# Patient Record
Sex: FEMALE | Race: WHITE | Employment: FULL TIME | URBAN - METROPOLITAN AREA
[De-identification: names, ages, dates, MRNs, and addresses within clinical notes are randomized per-mention and may not be internally consistent; named-entity substitution may affect disease eponyms.]

---

## 2017-05-10 LAB
BLD GP AB SCN SERPL QL: NEGATIVE
HBV SURFACE AG SERPL QL IA: NON REACTIVE
HIV 1+2 AB+HIV1 P24 AG SERPL QL IA: NORMAL
RUBELLA ANTIBODY IGG QUANTITATIVE: NORMAL IU/ML
T PALLIDUM IGG SER QL: NON REACTIVE

## 2017-08-24 ENCOUNTER — OFFICE VISIT (OUTPATIENT)
Dept: SURGERY | Facility: CLINIC | Age: 32
End: 2017-08-24
Payer: COMMERCIAL

## 2017-08-24 VITALS
SYSTOLIC BLOOD PRESSURE: 100 MMHG | HEIGHT: 61 IN | WEIGHT: 155 LBS | DIASTOLIC BLOOD PRESSURE: 50 MMHG | BODY MASS INDEX: 29.27 KG/M2 | HEART RATE: 70 BPM

## 2017-08-24 DIAGNOSIS — K42.9 UMBILICAL HERNIA WITHOUT OBSTRUCTION AND WITHOUT GANGRENE: Primary | ICD-10-CM

## 2017-08-24 PROCEDURE — 99244 OFF/OP CNSLTJ NEW/EST MOD 40: CPT | Performed by: SURGERY

## 2017-08-24 NOTE — LETTER
"    2017    [facility]  Surgery Consultation    RE:  Chelle Rocha-:  [birth year]     CONSULTATION REQUESTED BY:  Tu Amanda [phone]     HPI: Patient is a 31-year-old female referred by her attending obstetrician for evaluation of umbilical hernia.  She is currently 25 weeks pregnant and states that she has noted the development of a bulge within her umbilicus.  She feels that this was also present with her previous pregnancy.  Causes her occasional mild discomfort.  Her bowel function has been normal.     PMH:   has a past medical history of Abnormal Pap smear; Anemia; Breast disorder; Migraine; PONV (postoperative nausea and vomiting); Postpartum depression; Renal disease; Rh incompatibility; Seasonal allergies; TMJ (temporomandibular joint syndrome); UTI in pregnancy; and Wounds and injuries.  PSH:    has a past surgical history that includes WISDOM TEETH EXTRACTION[; GYN surgery; Arthroscopy temporomandibular joint (TMJ) (2012); leep tx, cervical (); Colposcopy, biopsy, combined (); REMOVAL OF OVARIAN CYST(S) (); Davinci Single Site Pelviscopy (N/A, 2014); Operative hysteroscopy with morcellator (N/A, 2014); Breast surgery (, ); and ENT surgery (2003).  Social History:   reports that she has never smoked. She has never used smokeless tobacco. She reports that she drinks alcohol. She reports that she does not use illicit drugs.  Family History:  family history is not on file.  Medications/Allergies: Home medications and allergies reviewed.     ROS:  The 10 point Review of Systems is negative other than noted in the HPI.     Physical Exam:  /50  Pulse 70  Ht 5' 1\" (1.549 m)  Wt 155 lb (70.3 kg)  BMI 29.29 kg/m2  GENERAL: Generally appears well.  Psych: Alert and Oriented.  Normal affect  Eyes: Sclera clear  Respiratory:  Lungs clear to ausculation bilaterally with good air excursion  Cardiovascular:  Regular Rate and " Rhythm with no murmurs gallops or rubs, normal peripheral pulses  GI: Abdomen Non Distended Mild tenderness to palpation periumbilical Umbilical hernia palpated.  Hernia easily reduciable..  Lymphatic/Hematologic/Immune:  No femoral or cervical lymphadenopathy.  Integumentary:  No rashes  Neurological: grossly intact      All new lab and imaging data was reviewed.      Impression and Plan:  Patient is a 31 year old female with umbilical hernia     PLAN: Patient informed me that she is looking to perhaps have this repaired shortly after delivery possibly associated with a postpartum tubal ligation.  I believe that this would be appropriate.  We will look to coordinate this with her obstetrician's office closer to her date of delivery.  I discussed the pathophysiology of hernias and options for repair including laparoscopic VS open.  The risks associated with the procedure including, but not limited to, recurrence, nerve entrapment or injury, persistence of pain, injury to the bowel/bladder, infertility, hematoma, mesh migration, mesh infection, MI, and PE were discussed with the patient. She indicated understanding of the discussion, asked appropriate questions, and provided consent. Signs and symptoms of incarceration were discussed. If these develop in the interim, she promises to call or go straight to the ER. I have provided the patient with an information pamphlet.     Thank you very much for this consult.     Wyatt Golden M.D.  [facility]  [phone]

## 2017-08-24 NOTE — PROGRESS NOTES
"[facility]  Surgery Consultation    CONSULTATION REQUESTED BY:  English Tu Luis [phone]    HPI: Patient is a 31-year-old female referred by her attending obstetrician for evaluation of umbilical hernia.  She is currently 25 weeks pregnant and states that she has noted the development of a bulge within her umbilicus.  She feels that this was also present with her previous pregnancy.  Causes her occasional mild discomfort.  Her bowel function has been normal.    PMH:   has a past medical history of Abnormal Pap smear; Anemia; Breast disorder; Migraine; PONV (postoperative nausea and vomiting); Postpartum depression; Renal disease; Rh incompatibility; Seasonal allergies; TMJ (temporomandibular joint syndrome); UTI in pregnancy; and Wounds and injuries.  PSH:    has a past surgical history that includes WISDOM TEETH EXTRACTION[; GYN surgery; Arthroscopy temporomandibular joint (TMJ) (7/12/2012); leep tx, cervical (2005); Colposcopy, biopsy, combined (2005); REMOVAL OF OVARIAN CYST(S) (2006); Davinci Single Site Pelviscopy (N/A, 9/25/2014); Operative hysteroscopy with morcellator (N/A, 9/25/2014); Breast surgery (2011, 2013); and ENT surgery (12/2003).  Social History:   reports that she has never smoked. She has never used smokeless tobacco. She reports that she drinks alcohol. She reports that she does not use illicit drugs.  Family History:  family history is not on file.  Medications/Allergies: Home medications and allergies reviewed.    ROS:  The 10 point Review of Systems is negative other than noted in the HPI.    Physical Exam:  /50  Pulse 70  Ht 5' 1\" (1.549 m)  Wt 155 lb (70.3 kg)  BMI 29.29 kg/m2  GENERAL: Generally appears well.  Psych: Alert and Oriented.  Normal affect  Eyes: Sclera clear  Respiratory:  Lungs clear to ausculation bilaterally with good air excursion  Cardiovascular:  Regular Rate and Rhythm with no murmurs gallops or rubs, normal peripheral pulses  GI: " Abdomen Non Distended Mild tenderness to palpation periumbilical Umbilical hernia palpated.  Hernia easily reduciable..  Lymphatic/Hematologic/Immune:  No femoral or cervical lymphadenopathy.  Integumentary:  No rashes  Neurological: grossly intact     All new lab and imaging data was reviewed.     Impression and Plan:  Patient is a 31 year old female with umbilical hernia    PLAN: Patient informed me that she is looking to perhaps have this repaired shortly after delivery possibly associated with a postpartum tubal ligation.  I believe that this would be appropriate.  We will look to coordinate this with her obstetrician's office closer to her date of delivery.  I discussed the pathophysiology of hernias and options for repair including laparoscopic VS open.  The risks associated with the procedure including, but not limited to, recurrence, nerve entrapment or injury, persistence of pain, injury to the bowel/bladder, infertility, hematoma, mesh migration, mesh infection, MI, and PE were discussed with the patient. She indicated understanding of the discussion, asked appropriate questions, and provided consent. Signs and symptoms of incarceration were discussed. If these develop in the interim, she promises to call or go straight to the ER. I have provided the patient with an information pamphlet.    Thank you very much for this consult.    Wyatt Golden M.D.  [facility]  [phone]    Please route or send letter to:  Primary Care Provider (PCP) and Referring Provider

## 2017-08-24 NOTE — MR AVS SNAPSHOT
"              After Visit Summary   2017    Chelle Rocha    MRN: [id number]           Patient Information     Date Of Birth          1985        Visit Information        Provider Department      2017 9:30 AM Wyatt Golden MD [facility]       Follow-ups after your visit        Who to contact     If you have questions or need follow up information about today's clinic visit or your schedule please contact [facility] directly at [phone].  Normal or non-critical lab and imaging results will be communicated to you by Caravanhart, letter or phone within 4 business days after the clinic has received the results. If you do not hear from us within 7 days, please contact the clinic through Caravanhart or phone. If you have a critical or abnormal lab result, we will notify you by phone as soon as possible.  Submit refill requests through Cormedics or call your pharmacy and they will forward the refill request to us. Please allow 3 business days for your refill to be completed.          Additional Information About Your Visit        MyChart Information     Cormedics lets you send messages to your doctor, view your test results, renew your prescriptions, schedule appointments and more. To sign up, go to [email or web address] . Click on \"Log in\" on the left side of the screen, which will take you to the Welcome page. Then click on \"Sign up Now\" on the right side of the page.     You will be asked to enter the access code listed below, as well as some personal information. Please follow the directions to create your username and password.     Your access code is: [email or web address]  Expires: 2017 10:02 AM     Your access code will  in 90 days. If you need help or a new code, please call your [place] clinic or [phone].        Care EveryWhere ID     This is your Care EveryWhere ID. This could be used by other " "organizations to access your [place] medical records  [id number]        Your Vitals Were     Pulse Height BMI (Body Mass Index)             70 5' 1\" (1.549 m) 29.29 kg/m2          Blood Pressure from Last 3 Encounters:   08/24/17 100/50   10/12/14 143/89   09/25/14 113/71    Weight from Last 3 Encounters:   08/24/17 155 lb (70.3 kg)   10/12/14 132 lb (59.9 kg)   09/25/14 134 lb (60.8 kg)              Today, you had the following     No orders found for display       Primary Care Provider Office Phone # Fax #    Tu Amanda [phone]       OB GYN [address] [place] [id number]        Equal Access to Services     APRYL [place] : Hadii jillian burnham hadasho Soomaali, waaxda luqadaha, qaybta kaalmada adeegyada, anh singleton . So [facility] [phone].    ATENCIÓN: Si habla español, tiene a avitia disposición servicios gratuitos de asistencia lingüística. Llame al [phone].    We comply with applicable federal civil rights laws and [place] laws. We do not discriminate on the basis of race, color, national origin, age, disability sex, sexual orientation or gender identity.            Thank you!     Thank you for choosing [facility]  for your care. Our goal is always to provide you with excellent care. Hearing back from our patients is one way we can continue to improve our services. Please take a few minutes to complete the written survey that you may receive in the mail after your visit with us. Thank you!             Your Updated Medication List - Protect others around you: Learn how to safely use, store and throw away your medicines at www.disposemymeds.org.          This list is accurate as of: 8/24/17 10:02 AM.  Always use your most recent med list.                   Brand Name Dispense Instructions for use Diagnosis    levonorgestrel-ethinyl estradiol 0.1-20 MG-MCG per tablet    HOLGER LEWIS LESSINA     Take 1 tablet by mouth " daily        ZANTAC PO      Take 75 mg by mouth 2 times daily

## 2017-10-05 ENCOUNTER — OFFICE VISIT (OUTPATIENT)
Dept: SURGERY | Facility: CLINIC | Age: 32
End: 2017-10-05
Payer: COMMERCIAL

## 2017-10-05 VITALS — HEART RATE: 80 BPM | SYSTOLIC BLOOD PRESSURE: 112 MMHG | DIASTOLIC BLOOD PRESSURE: 50 MMHG | RESPIRATION RATE: 16 BRPM

## 2017-10-05 DIAGNOSIS — K42.9 UMBILICAL HERNIA WITHOUT OBSTRUCTION AND WITHOUT GANGRENE: Primary | ICD-10-CM

## 2017-10-05 DIAGNOSIS — M62.08 DIASTASIS RECTI: ICD-10-CM

## 2017-10-05 PROCEDURE — 99214 OFFICE O/P EST MOD 30 MIN: CPT | Performed by: SURGERY

## 2017-10-05 NOTE — MR AVS SNAPSHOT
"              After Visit Summary   10/5/2017    Chelle Rocha    MRN: [id number]           Patient Information     Date Of Birth          1985        Visit Information        Provider Department      10/5/2017 11:00 AM Wyatt Golden MD [facility] General Surgery       Follow-ups after your visit        Who to contact     If you have questions or need follow up information about today's clinic visit or your schedule please contact [facility] directly at [phone].  Normal or non-critical lab and imaging results will be communicated to you by MyChart, letter or phone within 4 business days after the clinic has received the results. If you do not hear from us within 7 days, please contact the clinic through [employer] Gardens[employer] or phone. If you have a critical or abnormal lab result, we will notify you by phone as soon as possible.  Submit refill requests through [email or web address] or call your pharmacy and they will forward the refill request to us. Please allow 3 business days for your refill to be completed.          Additional Information About Your Visit        MyChart Information     [email or web address] lets you send messages to your doctor, view your test results, renew your prescriptions, schedule appointments and more. To sign up, go to [email or web address] . Click on \"Log in\" on the left side of the screen, which will take you to the Welcome page. Then click on \"Sign up Now\" on the right side of the page.     You will be asked to enter the access code listed below, as well as some personal information. Please follow the directions to create your username and password.     Your access code is: [email or web address]  Expires: 2017 10:02 AM     Your access code will  in 90 days. If you need help or a new code, please call your [place] clinic or [phone].        Care EveryWhere ID     This is your Care EveryWhere ID. This could be used by other " organizations to access your [place] medical records  [id number]        Your Vitals Were     Pulse Respirations                80 16           Blood Pressure from Last 3 Encounters:   10/05/17 112/50   08/24/17 100/50   10/12/14 143/89    Weight from Last 3 Encounters:   08/24/17 155 lb (70.3 kg)   10/12/14 132 lb (59.9 kg)   09/25/14 134 lb (60.8 kg)              Today, you had the following     No orders found for display       Primary Care Provider Office Phone # Fax #    Tu Amanda [phone]       OB GYN [address]  [facility] [id number]        Equal Access to Services     APRYL HELTON : Hadii jillian ku hadasho Soomaali, waaxda luqadaha, qaybta kaalmada adeegyada, anh singleton . So [facility] [phone].    ATENCIÓN: Si habla español, tiene a avitia disposición servicios gratuitos de asistencia lingüística. Llame al [phone].    We comply with applicable federal civil rights laws and [place] laws. We do not discriminate on the basis of race, color, national origin, age, disability, sex, sexual orientation, or gender identity.            Thank you!     Thank you for choosing [facility]  for your care. Our goal is always to provide you with excellent care. Hearing back from our patients is one way we can continue to improve our services. Please take a few minutes to complete the written survey that you may receive in the mail after your visit with us. Thank you!             Your Updated Medication List - Protect others around you: Learn how to safely use, store and throw away your medicines at www.disposemymeds.org.          This list is accurate as of: 10/5/17 11:22 AM.  Always use your most recent med list.                   Brand Name Dispense Instructions for use Diagnosis    levonorgestrel-ethinyl estradiol 0.1-20 MG-MCG per tablet    HOLGER LEWIS LESSINA     Take 1 tablet by mouth daily        ZANTAC PO      Take 75  mg by mouth 2 times daily

## 2017-10-05 NOTE — LETTER
2017    Re: Chelle Rocha : 1985    Patient is a pleasant 31-year-old female seen previously by myself for consultation of umbilical hernia. Please reference my note from  regarding that visit.  She is currently pregnant and comes in to discuss issues associated with rectus diastases.  She feels that this has become more prevalent with her subsequent pregnancies.  She has some degree of back pain and discomfort associated with this.  She also feels significant core instability and weakness.     She was again examined in the office.  She has an obvious umbilical hernia and palpable diastases although it is somewhat difficult to assess given her gravid state.     We discussed at length the management options associated with diastases and umbilical hernia.  I think she will be a good candidate for abdominal wall reconstruction.  That said I would like to see her at least six months postpartum before we would undertake that procedure.  Her questions were all answered.  I requested that she come in for consult when she is approximately 4-5 months postpartum to discuss this further.      Wyatt Golden MD

## 2017-10-16 NOTE — PROGRESS NOTES
Patient is a pleasant 31-year-old female seen previously by myself for consultation of umbilical hernia.  Please reference my note from August 24 regarding that visit.  She is currently pregnant and comes in to discuss issues associated with rectus diastases.  She feels that this has become more prevalent with her subsequent pregnancies.  She has some degree of back pain and discomfort associated with this.  She also feels significant core instability and weakness.    She was again examined in the office.  She has an obvious umbilical hernia and palpable diastases although it is somewhat difficult to assess given her gravid state.    We discussed at length the management options associated with diastases and umbilical hernia.  I think she will be a good candidate for abdominal wall reconstruction.  That said I would like to see her at least six months postpartum before we would undertake that procedure.  Her questions were all answered.  I requested that she come in for consult when she is approximately 4-5 months postpartum to discuss this further.  Total time spent was 30 minutes with greater than 50% in face-to-face consultation.    Please route or send letter to:  Primary Care Provider (PCP) and Referring Provider

## 2017-11-14 LAB — GROUP B STREP PCR: NEGATIVE

## 2017-12-08 ENCOUNTER — ANESTHESIA (OUTPATIENT)
Dept: OBGYN | Facility: CLINIC | Age: 32
End: 2017-12-08
Payer: COMMERCIAL

## 2017-12-08 ENCOUNTER — ANESTHESIA EVENT (OUTPATIENT)
Dept: OBGYN | Facility: CLINIC | Age: 32
End: 2017-12-08
Payer: COMMERCIAL

## 2017-12-08 ENCOUNTER — HOSPITAL ENCOUNTER (INPATIENT)
Facility: CLINIC | Age: 32
LOS: 2 days | Discharge: HOME | End: 2017-12-10
Attending: OBSTETRICS & GYNECOLOGY | Admitting: OBSTETRICS & GYNECOLOGY
Payer: COMMERCIAL

## 2017-12-08 DIAGNOSIS — D62 ACUTE POSTHEMORRHAGIC ANEMIA: Primary | ICD-10-CM

## 2017-12-08 LAB
ABO + RH BLD: NORMAL
ABO + RH BLD: NORMAL
BLOOD BANK CMNT PATIENT-IMP: NORMAL
SPECIMEN EXP DATE BLD: NORMAL
T PALLIDUM IGG+IGM SER QL: NEGATIVE

## 2017-12-08 PROCEDURE — 25000128 H RX IP 250 OP 636: Performed by: PHYSICIAN ASSISTANT

## 2017-12-08 PROCEDURE — 36415 COLL VENOUS BLD VENIPUNCTURE: CPT | Performed by: PHYSICIAN ASSISTANT

## 2017-12-08 PROCEDURE — 25000125 ZZHC RX 250: Performed by: PHYSICIAN ASSISTANT

## 2017-12-08 PROCEDURE — 25000132 ZZH RX MED GY IP 250 OP 250 PS 637: Performed by: OBSTETRICS & GYNECOLOGY

## 2017-12-08 PROCEDURE — 25000128 H RX IP 250 OP 636: Performed by: ANESTHESIOLOGY

## 2017-12-08 PROCEDURE — 10907ZC DRAINAGE OF AMNIOTIC FLUID, THERAPEUTIC FROM PRODUCTS OF CONCEPTION, VIA NATURAL OR ARTIFICIAL OPENING: ICD-10-PCS | Performed by: OBSTETRICS & GYNECOLOGY

## 2017-12-08 PROCEDURE — 86901 BLOOD TYPING SEROLOGIC RH(D): CPT | Performed by: PHYSICIAN ASSISTANT

## 2017-12-08 PROCEDURE — 0HQ9XZZ REPAIR PERINEUM SKIN, EXTERNAL APPROACH: ICD-10-PCS | Performed by: OBSTETRICS & GYNECOLOGY

## 2017-12-08 PROCEDURE — 00HU33Z INSERTION OF INFUSION DEVICE INTO SPINAL CANAL, PERCUTANEOUS APPROACH: ICD-10-PCS | Performed by: ANESTHESIOLOGY

## 2017-12-08 PROCEDURE — 3E0R3BZ INTRODUCTION OF ANESTHETIC AGENT INTO SPINAL CANAL, PERCUTANEOUS APPROACH: ICD-10-PCS | Performed by: ANESTHESIOLOGY

## 2017-12-08 PROCEDURE — 25000125 ZZHC RX 250: Performed by: OBSTETRICS & GYNECOLOGY

## 2017-12-08 PROCEDURE — 37000011 ZZH ANESTHESIA WARD SERVICE

## 2017-12-08 PROCEDURE — 86780 TREPONEMA PALLIDUM: CPT | Performed by: PHYSICIAN ASSISTANT

## 2017-12-08 PROCEDURE — 10D17Z9 MANUAL EXTRACTION OF PRODUCTS OF CONCEPTION, RETAINED, VIA NATURAL OR ARTIFICIAL OPENING: ICD-10-PCS | Performed by: OBSTETRICS & GYNECOLOGY

## 2017-12-08 PROCEDURE — 86900 BLOOD TYPING SEROLOGIC ABO: CPT | Performed by: PHYSICIAN ASSISTANT

## 2017-12-08 PROCEDURE — 3E033VJ INTRODUCTION OF OTHER HORMONE INTO PERIPHERAL VEIN, PERCUTANEOUS APPROACH: ICD-10-PCS | Performed by: OBSTETRICS & GYNECOLOGY

## 2017-12-08 PROCEDURE — 12000037 ZZH R&B POSTPARTUM INTERMEDIATE

## 2017-12-08 PROCEDURE — 72200001 ZZH LABOR CARE VAGINAL DELIVERY SINGLE

## 2017-12-08 RX ORDER — IBUPROFEN 600 MG/1
600 TABLET, FILM COATED ORAL EVERY 6 HOURS PRN
Status: DISCONTINUED | OUTPATIENT
Start: 2017-12-08 | End: 2017-12-10 | Stop reason: HOSPADM

## 2017-12-08 RX ORDER — OXYTOCIN 10 [USP'U]/ML
10 INJECTION, SOLUTION INTRAMUSCULAR; INTRAVENOUS
Status: DISCONTINUED | OUTPATIENT
Start: 2017-12-08 | End: 2017-12-10 | Stop reason: HOSPADM

## 2017-12-08 RX ORDER — NALOXONE HYDROCHLORIDE 0.4 MG/ML
.1-.4 INJECTION, SOLUTION INTRAMUSCULAR; INTRAVENOUS; SUBCUTANEOUS
Status: DISCONTINUED | OUTPATIENT
Start: 2017-12-08 | End: 2017-12-08

## 2017-12-08 RX ORDER — OXYTOCIN/0.9 % SODIUM CHLORIDE 30/500 ML
340 PLASTIC BAG, INJECTION (ML) INTRAVENOUS CONTINUOUS PRN
Status: DISCONTINUED | OUTPATIENT
Start: 2017-12-08 | End: 2017-12-10 | Stop reason: HOSPADM

## 2017-12-08 RX ORDER — NALOXONE HYDROCHLORIDE 0.4 MG/ML
.1-.4 INJECTION, SOLUTION INTRAMUSCULAR; INTRAVENOUS; SUBCUTANEOUS
Status: DISCONTINUED | OUTPATIENT
Start: 2017-12-08 | End: 2017-12-10 | Stop reason: HOSPADM

## 2017-12-08 RX ORDER — OXYTOCIN 10 [USP'U]/ML
10 INJECTION, SOLUTION INTRAMUSCULAR; INTRAVENOUS
Status: DISCONTINUED | OUTPATIENT
Start: 2017-12-08 | End: 2017-12-08

## 2017-12-08 RX ORDER — SODIUM CHLORIDE, SODIUM LACTATE, POTASSIUM CHLORIDE, CALCIUM CHLORIDE 600; 310; 30; 20 MG/100ML; MG/100ML; MG/100ML; MG/100ML
INJECTION, SOLUTION INTRAVENOUS CONTINUOUS
Status: DISCONTINUED | OUTPATIENT
Start: 2017-12-08 | End: 2017-12-08

## 2017-12-08 RX ORDER — IBUPROFEN 400 MG/1
800 TABLET, FILM COATED ORAL
Status: DISCONTINUED | OUTPATIENT
Start: 2017-12-08 | End: 2017-12-08

## 2017-12-08 RX ORDER — OXYTOCIN/0.9 % SODIUM CHLORIDE 30/500 ML
100-340 PLASTIC BAG, INJECTION (ML) INTRAVENOUS CONTINUOUS PRN
Status: DISCONTINUED | OUTPATIENT
Start: 2017-12-08 | End: 2017-12-08

## 2017-12-08 RX ORDER — FENTANYL CITRATE 50 UG/ML
100 INJECTION, SOLUTION INTRAMUSCULAR; INTRAVENOUS ONCE
Status: COMPLETED | OUTPATIENT
Start: 2017-12-08 | End: 2017-12-08

## 2017-12-08 RX ORDER — ROPIVACAINE HYDROCHLORIDE 2 MG/ML
10 INJECTION, SOLUTION EPIDURAL; INFILTRATION; PERINEURAL ONCE
Status: COMPLETED | OUTPATIENT
Start: 2017-12-08 | End: 2017-12-08

## 2017-12-08 RX ORDER — CEFAZOLIN SODIUM 2 G/100ML
2 INJECTION, SOLUTION INTRAVENOUS
Status: DISCONTINUED | OUTPATIENT
Start: 2017-12-08 | End: 2017-12-08 | Stop reason: HOSPADM

## 2017-12-08 RX ORDER — ACETAMINOPHEN 325 MG/1
650 TABLET ORAL EVERY 4 HOURS PRN
Status: DISCONTINUED | OUTPATIENT
Start: 2017-12-08 | End: 2017-12-10 | Stop reason: HOSPADM

## 2017-12-08 RX ORDER — OXYTOCIN/0.9 % SODIUM CHLORIDE 30/500 ML
1-24 PLASTIC BAG, INJECTION (ML) INTRAVENOUS CONTINUOUS
Status: DISCONTINUED | OUTPATIENT
Start: 2017-12-08 | End: 2017-12-08

## 2017-12-08 RX ORDER — MISOPROSTOL 200 UG/1
400 TABLET ORAL
Status: DISCONTINUED | OUTPATIENT
Start: 2017-12-08 | End: 2017-12-10 | Stop reason: HOSPADM

## 2017-12-08 RX ORDER — HYDROCORTISONE 2.5 %
CREAM (GRAM) TOPICAL 3 TIMES DAILY PRN
Status: DISCONTINUED | OUTPATIENT
Start: 2017-12-08 | End: 2017-12-10 | Stop reason: HOSPADM

## 2017-12-08 RX ORDER — NALBUPHINE HYDROCHLORIDE 10 MG/ML
2.5-5 INJECTION, SOLUTION INTRAMUSCULAR; INTRAVENOUS; SUBCUTANEOUS EVERY 6 HOURS PRN
Status: DISCONTINUED | OUTPATIENT
Start: 2017-12-08 | End: 2017-12-08

## 2017-12-08 RX ORDER — CARBOPROST TROMETHAMINE 250 UG/ML
250 INJECTION, SOLUTION INTRAMUSCULAR
Status: COMPLETED | OUTPATIENT
Start: 2017-12-08 | End: 2017-12-08

## 2017-12-08 RX ORDER — AMOXICILLIN 250 MG
1 CAPSULE ORAL 2 TIMES DAILY PRN
Status: DISCONTINUED | OUTPATIENT
Start: 2017-12-08 | End: 2017-12-10 | Stop reason: HOSPADM

## 2017-12-08 RX ORDER — ACETAMINOPHEN 325 MG/1
650 TABLET ORAL EVERY 4 HOURS PRN
Status: DISCONTINUED | OUTPATIENT
Start: 2017-12-08 | End: 2017-12-08

## 2017-12-08 RX ORDER — HYDROCODONE BITARTRATE AND ACETAMINOPHEN 5; 325 MG/1; MG/1
1 TABLET ORAL EVERY 6 HOURS PRN
Status: DISCONTINUED | OUTPATIENT
Start: 2017-12-08 | End: 2017-12-09 | Stop reason: CLARIF

## 2017-12-08 RX ORDER — OXYTOCIN/0.9 % SODIUM CHLORIDE 30/500 ML
100 PLASTIC BAG, INJECTION (ML) INTRAVENOUS CONTINUOUS
Status: DISCONTINUED | OUTPATIENT
Start: 2017-12-08 | End: 2017-12-10 | Stop reason: HOSPADM

## 2017-12-08 RX ORDER — ACETAMINOPHEN 325 MG/1
975 TABLET ORAL ONCE
Status: DISCONTINUED | OUTPATIENT
Start: 2017-12-08 | End: 2017-12-08 | Stop reason: HOSPADM

## 2017-12-08 RX ORDER — ONDANSETRON 2 MG/ML
4 INJECTION INTRAMUSCULAR; INTRAVENOUS EVERY 6 HOURS PRN
Status: DISCONTINUED | OUTPATIENT
Start: 2017-12-08 | End: 2017-12-08

## 2017-12-08 RX ORDER — PRENATAL VIT/IRON FUM/FOLIC AC 27MG-0.8MG
1 TABLET ORAL DAILY
Status: ON HOLD | COMMUNITY
End: 2018-01-24

## 2017-12-08 RX ORDER — METHYLERGONOVINE MALEATE 0.2 MG/ML
200 INJECTION INTRAVENOUS
Status: DISCONTINUED | OUTPATIENT
Start: 2017-12-08 | End: 2017-12-08

## 2017-12-08 RX ORDER — BISACODYL 10 MG
10 SUPPOSITORY, RECTAL RECTAL DAILY PRN
Status: DISCONTINUED | OUTPATIENT
Start: 2017-12-10 | End: 2017-12-10 | Stop reason: HOSPADM

## 2017-12-08 RX ORDER — AMOXICILLIN 250 MG
2 CAPSULE ORAL 2 TIMES DAILY PRN
Status: DISCONTINUED | OUTPATIENT
Start: 2017-12-08 | End: 2017-12-10 | Stop reason: HOSPADM

## 2017-12-08 RX ORDER — EPHEDRINE SULFATE 50 MG/ML
5 INJECTION, SOLUTION INTRAMUSCULAR; INTRAVENOUS; SUBCUTANEOUS
Status: DISCONTINUED | OUTPATIENT
Start: 2017-12-08 | End: 2017-12-08

## 2017-12-08 RX ORDER — OXYCODONE AND ACETAMINOPHEN 5; 325 MG/1; MG/1
1 TABLET ORAL
Status: DISCONTINUED | OUTPATIENT
Start: 2017-12-08 | End: 2017-12-08

## 2017-12-08 RX ORDER — LIDOCAINE 40 MG/G
CREAM TOPICAL
Status: DISCONTINUED | OUTPATIENT
Start: 2017-12-08 | End: 2017-12-08

## 2017-12-08 RX ORDER — LANOLIN 100 %
OINTMENT (GRAM) TOPICAL
Status: DISCONTINUED | OUTPATIENT
Start: 2017-12-08 | End: 2017-12-10 | Stop reason: HOSPADM

## 2017-12-08 RX ADMIN — Medication 12 ML/HR: at 13:44

## 2017-12-08 RX ADMIN — IBUPROFEN 600 MG: 600 TABLET ORAL at 20:51

## 2017-12-08 RX ADMIN — OXYTOCIN-SODIUM CHLORIDE 0.9% IV SOLN 30 UNIT/500ML 4 MILLI-UNITS/MIN: 30-0.9/5 SOLUTION at 09:27

## 2017-12-08 RX ADMIN — FENTANYL CITRATE 50 MCG: 50 INJECTION, SOLUTION INTRAMUSCULAR; INTRAVENOUS at 13:22

## 2017-12-08 RX ADMIN — OXYTOCIN-SODIUM CHLORIDE 0.9% IV SOLN 30 UNIT/500ML 2 MILLI-UNITS/MIN: 30-0.9/5 SOLUTION at 08:47

## 2017-12-08 RX ADMIN — HYDROCODONE BITARTRATE AND ACETAMINOPHEN 1 TABLET: 5; 325 TABLET ORAL at 20:52

## 2017-12-08 RX ADMIN — ROPIVACAINE HYDROCHLORIDE 5 ML: 2 INJECTION, SOLUTION EPIDURAL; INFILTRATION at 13:23

## 2017-12-08 RX ADMIN — OXYTOCIN-SODIUM CHLORIDE 0.9% IV SOLN 30 UNIT/500ML 7 MILLI-UNITS/MIN: 30-0.9/5 SOLUTION at 10:59

## 2017-12-08 RX ADMIN — FENTANYL CITRATE 50 MCG: 50 INJECTION, SOLUTION INTRAMUSCULAR; INTRAVENOUS at 13:21

## 2017-12-08 RX ADMIN — SODIUM CHLORIDE, POTASSIUM CHLORIDE, SODIUM LACTATE AND CALCIUM CHLORIDE: 600; 310; 30; 20 INJECTION, SOLUTION INTRAVENOUS at 08:25

## 2017-12-08 RX ADMIN — OXYTOCIN-SODIUM CHLORIDE 0.9% IV SOLN 30 UNIT/500ML 9 MILLI-UNITS/MIN: 30-0.9/5 SOLUTION at 17:51

## 2017-12-08 RX ADMIN — ROPIVACAINE HYDROCHLORIDE 5 ML: 2 INJECTION, SOLUTION EPIDURAL; INFILTRATION at 13:20

## 2017-12-08 RX ADMIN — Medication 340 ML/HR: at 18:55

## 2017-12-08 RX ADMIN — OXYTOCIN-SODIUM CHLORIDE 0.9% IV SOLN 30 UNIT/500ML 6 MILLI-UNITS/MIN: 30-0.9/5 SOLUTION at 10:02

## 2017-12-08 RX ADMIN — MISOPROSTOL 800 MCG: 200 TABLET ORAL at 19:00

## 2017-12-08 RX ADMIN — CARBOPROST TROMETHAMINE 250 MCG: 250 INJECTION, SOLUTION INTRAMUSCULAR at 18:57

## 2017-12-08 RX ADMIN — SODIUM CHLORIDE, POTASSIUM CHLORIDE, SODIUM LACTATE AND CALCIUM CHLORIDE 1000 ML: 600; 310; 30; 20 INJECTION, SOLUTION INTRAVENOUS at 12:50

## 2017-12-08 NOTE — ANESTHESIA PREPROCEDURE EVALUATION
Anesthesia Evaluation       history and physical reviewed .             ROS/MED HX    ENT/Pulmonary:       Neurologic:       Cardiovascular:         METS/Exercise Tolerance:     Hematologic:         Musculoskeletal:         GI/Hepatic:         Renal/Genitourinary:         Endo:         Psychiatric:         Infectious Disease:         Malignancy:         Other:                                    Anesthesia Plan      History & Physical Review      ASA Status:  .  OB Epidural Asa: 3            Postoperative Care      Consents                          .

## 2017-12-08 NOTE — PROGRESS NOTES
1510- care continues with this RN.  See flow sheet.  1545- SVE 9cm, repositioned to other side.  MD on his way.  POC reviewed with pt.  1615- Dr. Amanda in department.  Informed of last SVE and position change.    1720- English at bedside.  straight cath by MD for 100cc. See flow sheet.    1730 - pt complete.  Starts pushing.    1832- delivery of baby girl.  See delivery record.  Assist of shoulder dystocia by this RN and suprapubic pressure.    1852- no delivery of placenta yet.  MD for manual distraction.    1857- methergine IM ordered per MD. See EMAR.  1900- cytotec 800mcg given rectally by MD.    1915- report to Zenia Gabriel.  Care taken over.

## 2017-12-08 NOTE — IP AVS SNAPSHOT
[address]    [place] [address]    Phone:  [phone]                                       After Visit Summary   12/8/2017    Chelle Rocha    MRN: [id number]           After Visit Summary Signature Page     I have received my discharge instructions, and my questions have been answered. I have discussed any challenges I see with this plan with the nurse or doctor.    ..........................................................................................................................................  Patient/Patient Representative Signature      ..........................................................................................................................................  Patient Representative Print Name and Relationship to Patient    ..................................................               ................................................  Date                                            Time    ..........................................................................................................................................  Reviewed by Signature/Title    ...................................................              ..............................................  Date                                                            Time

## 2017-12-08 NOTE — IP AVS SNAPSHOT
MRN:[id number]                      After Visit Summary   12/8/2017    Chelle Rocha    MRN: [id number]           Thank you!     Thank you for choosing [facility] for your care. Our goal is always to provide you with excellent care. Hearing back from our patients is one way we can continue to improve our services. Please take a few minutes to complete the written survey that you may receive in the mail after you visit with us. Thank you!        Patient Information     Date Of Birth          1985        Designated Caregiver       Most Recent Value    Caregiver    Will someone help with your care after discharge? yes      About your hospital stay     You were admitted on:  December 8, 2017 You last received care in the:  [address]    You were discharged on:  December 10, 2017       Who to Call     For medical emergencies, please call 911.  For non-urgent questions about your medical care, please call your primary care provider or clinic, [phone]  For questions related to your surgery, please call your surgery clinic        Attending Provider     Provider Specialty    Tu Amanda MD OB/Gyn       Primary Care Provider Office Phone # Fax #    Tu Amanda [phone]      Further instructions from your care team       Postpartum Vaginal Delivery Instructions    Activity       Ask family and friends for help when you need it.    Do not place anything in your vagina for 6 weeks.    You are not restricted on other activities, but take it easy for a few weeks to allow your body to recover from delivery.  You are able to do any activities you feel up to that point.    No driving until you have stopped taking your pain medications (usually two weeks after delivery).     Call your health care provider if you have any of these symptoms:       Increased pain, swelling, redness, or fluid around your stiches from an episiotomy or perineal  tear.    A fever above 100.4 F (38 C) with or without chills when placing a thermometer under your tongue.    You soak a sanitary pad with blood within 1 hour, or you see blood clots larger than a golf ball.    Bleeding that lasts more than 6 weeks.    Vaginal discharge that smells bad.    Severe pain, cramping or tenderness in your lower belly area.    A need to urinate more frequently (use the toilet more often), more urgently (use the toilet very quickly), or it burns when you urinate.    Nausea and vomiting.    Redness, swelling or pain around a vein in your leg.    Problems breastfeeding or a red or painful area on your breast.    Chest pain and cough or are gasping for air.    Problems coping with sadness, anxiety, or depression.  If you have any concerns about hurting yourself or the baby, call your provider immediately.     You have questions or concerns after you return home.     Keep your hands clean:  Always wash your hands before touching your perineal area and stitches.  This helps reduce your risk of infection.  If your hands aren't dirty, you may use an alcohol hand-rub to clean your hands. Keep your nails clean and short.        Discharge Instructions    You should set up an appointment to see your doctor in 6-8 weeks after delivery for a postpartum exam.   You should also call if you develop any heavy vaginal bleeding worse than a menstrual period, or fever over 100.5 degrees, or vomiting or increased pain.    You should abstain from intercourse for six weeks after delivery. You should avoid a bath for 1 week after delivery but showering is ok. If you have any questions about yourself or the baby, feel free to call or if any urgent concerns after hours, please go to the emergency room.    Mami Locke MD    Pending Results     Date and Time Order Name Status Description    12/9/2017 0600 Rh Immune Globulin Study In process             Statement of Approval     Ordered          12/10/17  "0952  I have reviewed and agree with all the recommendations and orders detailed in this document.  EFFECTIVE NOW     Approved and electronically signed by:  Mami Locke MD             Admission Information     Date & Time Provider Department Dept. Phone    2017 Tu Amanda MD [address] [phone]      Your Vitals Were     Blood Pressure Temperature Respirations Height Weight Pulse Oximetry    137/85 (BP Location: Left arm) 97.4  F (36.3  C) (Oral) 16 1.549 m (5' 1\") 81.6 kg (180 lb) 95%    BMI (Body Mass Index)                   34.01 kg/m2           MyChart Information     [employer]IQ lets you send messages to your doctor, view your test results, renew your prescriptions, schedule appointments and more. To sign up, go to [email or web address] . Click on \"Log in\" on the left side of the screen, which will take you to the Welcome page. Then click on \"Sign up Now\" on the right side of the page.     You will be asked to enter the access code listed below, as well as some personal information. Please follow the directions to create your username and password.     Your access code is: [email or web address]  Expires: 3/10/2018  1:34 PM     Your access code will  in 90 days. If you need help or a new code, please call your [facility] clinic or [phone].        Care EveryWhere ID     This is your Care EveryWhere ID. This could be used by other organizations to access your [facility] medical records  [id number]        Equal Access to Services     [facility]: Hadii jillian burnham haddestineeo Soetelvina, waaxda luqadaha, qaybta kaalmashila colin, anh singleton . So [facility] [phone].    ATENCIÓN: Si habla español, tiene a avitia disposición servicios gratuitos de asistencia lingüística. Llame al [phone].    We comply with applicable federal civil rights laws and [place] laws. We do not discriminate on the basis of race, color, national origin, age, " disability, sex, sexual orientation, or gender identity.               Review of your medicines      UNREVIEWED medicines. Ask your doctor about these medicines        Dose / Directions    prenatal multivitamin plus iron 27-0.8 MG Tabs per tablet   Indication:  Pregnancy        Dose:  1 tablet   Take 1 tablet by mouth daily   Refills:  0       ZANTAC PO        Dose:  75 mg   Take 75 mg by mouth 2 times daily   Refills:  0         START taking        Dose / Directions    ferrous sulfate 325 (65 FE) MG tablet   Commonly known as:  IRON   Used for:  Acute posthemorrhagic anemia        Dose:  325 mg   Take 1 tablet (325 mg) by mouth 2 times daily   Quantity:  100 tablet   Refills:  0       ibuprofen 600 MG tablet   Commonly known as:  ADVIL/MOTRIN        Dose:  600 mg   Take 1 tablet (600 mg) by mouth every 6 hours as needed for other (cramping)   Quantity:  120 tablet   Refills:  0       oxyCODONE IR 5 MG tablet   Commonly known as:  ROXICODONE        Dose:  5-10 mg   Take 1-2 tablets (5-10 mg) by mouth every 3 hours as needed for moderate to severe pain   Quantity:  30 tablet   Refills:  0            Where to get your medicines      Some of these will need a paper prescription and others can be bought over the counter. Ask your nurse if you have questions.     Bring a paper prescription for each of these medications     ferrous sulfate 325 (65 FE) MG tablet    ibuprofen 600 MG tablet    oxyCODONE IR 5 MG tablet                Protect others around you: Learn how to safely use, store and throw away your medicines at www.disposemymeds.org.             Medication List: This is a list of all your medications and when to take them. Check marks below indicate your daily home schedule. Keep this list as a reference.      Medications           Morning Afternoon Evening Bedtime As Needed    ferrous sulfate 325 (65 FE) MG tablet   Commonly known as:  IRON   Take 1 tablet (325 mg) by mouth 2 times daily   Last time this was  given:  325 mg on 12/10/2017  8:35 AM                                ibuprofen 600 MG tablet   Commonly known as:  ADVIL/MOTRIN   Take 1 tablet (600 mg) by mouth every 6 hours as needed for other (cramping)   Last time this was given:  600 mg on 12/10/2017 12:28 PM                                oxyCODONE IR 5 MG tablet   Commonly known as:  ROXICODONE   Take 1-2 tablets (5-10 mg) by mouth every 3 hours as needed for moderate to severe pain   Last time this was given:  5 mg on 12/10/2017 10:30 AM                                prenatal multivitamin plus iron 27-0.8 MG Tabs per tablet   Take 1 tablet by mouth daily                                ZANTAC PO   Take 75 mg by mouth 2 times daily                                          More Information        Discharge from Outpatient Surgery  You are scheduled for outpatient surgery. This is also called same-day or ambulatory surgery. This sheet will help you learn what to expect after your surgery.  Recovering at home  Taking medicines  You may be given pain medicines or other medicines after surgery.    Take pain medicine at regular times as instructed. Do not wait until pain gets bad before you take it. Take only as much pain medicine as prescribed.    Don t drive, use power tools or other dangerous machines, or drink alcohol while taking pain medicine.    If you have been given antibiotics, take them until they are gone or you are told to stop. If you have trouble taking them or have side effects, call your healthcare provider.  Following up  Someone from your healthcare team may call to check how you re doing. Tell him or her if you have any problems or questions.  When to call your healthcare provider  Call if you have any of the following:    You can t keep food or fluids down    You have not urinated within the time your healthcare team noted    You have not had a bowel movement within the time your healthcare team noted    Your bandage soaks through (some  bleeding and leakage is normal)    Your pain gets worse and is not eased by pain medicine  Call if you have any of these signs of infection:    Fever of 100.4 F (38 C) or higher, or as directed    Bleeding or swelling that increases    Bad smell, warmth, or green or yellow discharge from the cut (incision)    A red, hard, hot, or painful area around the cut or on your legs    Shortness of breath    Chest pain   Date Last Reviewed: 5/1/2016 2000-2017 [employer]. [address] [place] [address]. All rights reserved. This information is not intended as a substitute for professional medical care. Always follow your healthcare professional's instructions.
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
## 2017-12-08 NOTE — ANESTHESIA PROCEDURE NOTES
Peripheral nerve/Neuraxial procedure note : epidural catheter  Pre-Procedure  Performed by OLAMIDE LIN  Location: OB      Pre-Anesthestic Checklist: patient identified, IV checked, site marked, risks and benefits discussed, informed consent, monitors and equipment checked, pre-op evaluation, at physician/surgeon's request and post-op pain management    Timeout  Correct Patient: Yes   Correct Procedure: Yes   Correct Site: Yes   Correct Laterality: Yes   Correct Position: Yes   Site Marked: Yes   .   Procedure Documentation    Diagnosis:Labor Pain.    Procedure:    Epidural catheter.  Insertion Site:L2-3  (midline approach) Injection technique: LORT saline   Local skin infiltrated with 4 mL of 1% lidocaine.  PIERCE at 4.5 cm     Patient Prep;mask, sterile gloves, povidone-iodine 7.5% surgical scrub, patient draped.  .  Needle: Touhy needle Needle Gauge: 17.    Needle Length (Inches) 3.5  # of attempts: 1 and  # of redirects:  1 .   Catheter: 19 G . .  Catheter threaded easily  .  .   .    Assessment/Narrative  Paresthesias: No.  .  .  Aspiration negative for heme or CSF  . Test dose of 3 mL lidocaine 1.5% w/ 1:200,000 epinephrine at. Test dose negative for signs of intravascular, subdural or intrathecal injection. Comments:  Patient tolerated procedure well

## 2017-12-09 ENCOUNTER — ANESTHESIA (OUTPATIENT)
Dept: SURGERY | Facility: CLINIC | Age: 32
End: 2017-12-09
Payer: COMMERCIAL

## 2017-12-09 ENCOUNTER — ANESTHESIA EVENT (OUTPATIENT)
Dept: SURGERY | Facility: CLINIC | Age: 32
End: 2017-12-09
Payer: COMMERCIAL

## 2017-12-09 LAB
BASOPHILS # BLD AUTO: 0 10E9/L (ref 0–0.2)
BASOPHILS NFR BLD AUTO: 0.1 %
DIFFERENTIAL METHOD BLD: ABNORMAL
EOSINOPHIL # BLD AUTO: 0 10E9/L (ref 0–0.7)
EOSINOPHIL NFR BLD AUTO: 0.4 %
ERYTHROCYTE [DISTWIDTH] IN BLOOD BY AUTOMATED COUNT: 14.6 % (ref 10–15)
HCT VFR BLD AUTO: 24.9 % (ref 35–47)
HGB BLD-MCNC: 8 G/DL (ref 11.7–15.7)
HGB BLD-MCNC: 8.5 G/DL (ref 11.7–15.7)
IMM GRANULOCYTES # BLD: 0 10E9/L (ref 0–0.4)
IMM GRANULOCYTES NFR BLD: 0.2 %
LYMPHOCYTES # BLD AUTO: 1.5 10E9/L (ref 0.8–5.3)
LYMPHOCYTES NFR BLD AUTO: 15.8 %
MCH RBC QN AUTO: 25.1 PG (ref 26.5–33)
MCHC RBC AUTO-ENTMCNC: 32.1 G/DL (ref 31.5–36.5)
MCV RBC AUTO: 78 FL (ref 78–100)
MONOCYTES # BLD AUTO: 0.8 10E9/L (ref 0–1.3)
MONOCYTES NFR BLD AUTO: 8.8 %
NEUTROPHILS # BLD AUTO: 6.9 10E9/L (ref 1.6–8.3)
NEUTROPHILS NFR BLD AUTO: 74.7 %
NRBC # BLD AUTO: 0 10*3/UL
NRBC BLD AUTO-RTO: 0 /100
PLATELET # BLD AUTO: 105 10E9/L (ref 150–450)
RBC # BLD AUTO: 3.19 10E12/L (ref 3.8–5.2)
WBC # BLD AUTO: 9.3 10E9/L (ref 4–11)

## 2017-12-09 PROCEDURE — 0UB70ZZ EXCISION OF BILATERAL FALLOPIAN TUBES, OPEN APPROACH: ICD-10-PCS | Performed by: OBSTETRICS & GYNECOLOGY

## 2017-12-09 PROCEDURE — 36000050 ZZH SURGERY LEVEL 2 1ST 30 MIN: Performed by: OBSTETRICS & GYNECOLOGY

## 2017-12-09 PROCEDURE — 25000132 ZZH RX MED GY IP 250 OP 250 PS 637: Performed by: OBSTETRICS & GYNECOLOGY

## 2017-12-09 PROCEDURE — 25000128 H RX IP 250 OP 636: Performed by: OBSTETRICS & GYNECOLOGY

## 2017-12-09 PROCEDURE — 88302 TISSUE EXAM BY PATHOLOGIST: CPT | Mod: 26 | Performed by: OBSTETRICS & GYNECOLOGY

## 2017-12-09 PROCEDURE — 36415 COLL VENOUS BLD VENIPUNCTURE: CPT | Performed by: OBSTETRICS & GYNECOLOGY

## 2017-12-09 PROCEDURE — 86900 BLOOD TYPING SEROLOGIC ABO: CPT | Performed by: OBSTETRICS & GYNECOLOGY

## 2017-12-09 PROCEDURE — 27210995 ZZH RX 272: Performed by: OBSTETRICS & GYNECOLOGY

## 2017-12-09 PROCEDURE — 25000128 H RX IP 250 OP 636: Performed by: NURSE ANESTHETIST, CERTIFIED REGISTERED

## 2017-12-09 PROCEDURE — 86901 BLOOD TYPING SEROLOGIC RH(D): CPT | Performed by: OBSTETRICS & GYNECOLOGY

## 2017-12-09 PROCEDURE — 37000009 ZZH ANESTHESIA TECHNICAL FEE, EACH ADDTL 15 MIN: Performed by: OBSTETRICS & GYNECOLOGY

## 2017-12-09 PROCEDURE — 40000170 ZZH STATISTIC PRE-PROCEDURE ASSESSMENT II: Performed by: OBSTETRICS & GYNECOLOGY

## 2017-12-09 PROCEDURE — 12000037 ZZH R&B POSTPARTUM INTERMEDIATE

## 2017-12-09 PROCEDURE — 85461 HEMOGLOBIN FETAL: CPT | Performed by: OBSTETRICS & GYNECOLOGY

## 2017-12-09 PROCEDURE — 36000052 ZZH SURGERY LEVEL 2 EA 15 ADDTL MIN: Performed by: OBSTETRICS & GYNECOLOGY

## 2017-12-09 PROCEDURE — 85018 HEMOGLOBIN: CPT | Performed by: OBSTETRICS & GYNECOLOGY

## 2017-12-09 PROCEDURE — 27210794 ZZH OR GENERAL SUPPLY STERILE: Performed by: OBSTETRICS & GYNECOLOGY

## 2017-12-09 PROCEDURE — 25000128 H RX IP 250 OP 636: Performed by: ANESTHESIOLOGY

## 2017-12-09 PROCEDURE — 88302 TISSUE EXAM BY PATHOLOGIST: CPT | Performed by: OBSTETRICS & GYNECOLOGY

## 2017-12-09 PROCEDURE — 37000008 ZZH ANESTHESIA TECHNICAL FEE, 1ST 30 MIN: Performed by: OBSTETRICS & GYNECOLOGY

## 2017-12-09 PROCEDURE — 71000012 ZZH RECOVERY PHASE 1 LEVEL 1 FIRST HR: Performed by: OBSTETRICS & GYNECOLOGY

## 2017-12-09 PROCEDURE — 25000125 ZZHC RX 250: Performed by: NURSE ANESTHETIST, CERTIFIED REGISTERED

## 2017-12-09 PROCEDURE — 85025 COMPLETE CBC W/AUTO DIFF WBC: CPT | Performed by: OBSTETRICS & GYNECOLOGY

## 2017-12-09 RX ORDER — ACETAMINOPHEN 325 MG/1
975 TABLET ORAL ONCE
Status: COMPLETED | OUTPATIENT
Start: 2017-12-09 | End: 2017-12-09

## 2017-12-09 RX ORDER — ROPIVACAINE HYDROCHLORIDE 2 MG/ML
10 INJECTION, SOLUTION EPIDURAL; INFILTRATION; PERINEURAL ONCE
Status: DISCONTINUED | OUTPATIENT
Start: 2017-12-09 | End: 2017-12-10 | Stop reason: HOSPADM

## 2017-12-09 RX ORDER — FERROUS SULFATE 325(65) MG
325 TABLET ORAL 2 TIMES DAILY
Status: DISCONTINUED | OUTPATIENT
Start: 2017-12-09 | End: 2017-12-10 | Stop reason: HOSPADM

## 2017-12-09 RX ORDER — NALOXONE HYDROCHLORIDE 0.4 MG/ML
.1-.4 INJECTION, SOLUTION INTRAMUSCULAR; INTRAVENOUS; SUBCUTANEOUS
Status: CANCELLED | OUTPATIENT
Start: 2017-12-09 | End: 2017-12-10

## 2017-12-09 RX ORDER — OXYCODONE HYDROCHLORIDE 5 MG/1
5-10 TABLET ORAL
Status: DISCONTINUED | OUTPATIENT
Start: 2017-12-09 | End: 2017-12-10 | Stop reason: HOSPADM

## 2017-12-09 RX ORDER — SODIUM CHLORIDE, SODIUM LACTATE, POTASSIUM CHLORIDE, CALCIUM CHLORIDE 600; 310; 30; 20 MG/100ML; MG/100ML; MG/100ML; MG/100ML
INJECTION, SOLUTION INTRAVENOUS CONTINUOUS
Status: DISCONTINUED | OUTPATIENT
Start: 2017-12-09 | End: 2017-12-09 | Stop reason: HOSPADM

## 2017-12-09 RX ORDER — CEFAZOLIN SODIUM 2 G/100ML
2 INJECTION, SOLUTION INTRAVENOUS
Status: COMPLETED | OUTPATIENT
Start: 2017-12-09 | End: 2017-12-09

## 2017-12-09 RX ORDER — FENTANYL CITRATE 50 UG/ML
INJECTION, SOLUTION INTRAMUSCULAR; INTRAVENOUS PRN
Status: DISCONTINUED | OUTPATIENT
Start: 2017-12-09 | End: 2017-12-09

## 2017-12-09 RX ORDER — LIDOCAINE HCL/EPINEPHRINE/PF 2%-1:200K
VIAL (ML) INJECTION PRN
Status: DISCONTINUED | OUTPATIENT
Start: 2017-12-09 | End: 2017-12-09

## 2017-12-09 RX ORDER — MAGNESIUM HYDROXIDE 1200 MG/15ML
LIQUID ORAL PRN
Status: DISCONTINUED | OUTPATIENT
Start: 2017-12-09 | End: 2017-12-09 | Stop reason: HOSPADM

## 2017-12-09 RX ORDER — ONDANSETRON 4 MG/1
4 TABLET, ORALLY DISINTEGRATING ORAL EVERY 30 MIN PRN
Status: DISCONTINUED | OUTPATIENT
Start: 2017-12-09 | End: 2017-12-09 | Stop reason: HOSPADM

## 2017-12-09 RX ORDER — LIDOCAINE HYDROCHLORIDE 20 MG/ML
INJECTION, SOLUTION INFILTRATION; PERINEURAL PRN
Status: DISCONTINUED | OUTPATIENT
Start: 2017-12-09 | End: 2017-12-09

## 2017-12-09 RX ORDER — LIDOCAINE HCL/EPINEPHRINE/PF 2%-1:200K
VIAL (ML) INJECTION
Status: DISCONTINUED
Start: 2017-12-09 | End: 2017-12-09 | Stop reason: HOSPADM

## 2017-12-09 RX ORDER — FENTANYL CITRATE 50 UG/ML
25-50 INJECTION, SOLUTION INTRAMUSCULAR; INTRAVENOUS
Status: DISCONTINUED | OUTPATIENT
Start: 2017-12-09 | End: 2017-12-09 | Stop reason: HOSPADM

## 2017-12-09 RX ORDER — ONDANSETRON 2 MG/ML
INJECTION INTRAMUSCULAR; INTRAVENOUS PRN
Status: DISCONTINUED | OUTPATIENT
Start: 2017-12-09 | End: 2017-12-09

## 2017-12-09 RX ORDER — PROPOFOL 10 MG/ML
INJECTION, EMULSION INTRAVENOUS CONTINUOUS PRN
Status: DISCONTINUED | OUTPATIENT
Start: 2017-12-09 | End: 2017-12-09

## 2017-12-09 RX ORDER — HYDROMORPHONE HYDROCHLORIDE 1 MG/ML
.3-.5 INJECTION, SOLUTION INTRAMUSCULAR; INTRAVENOUS; SUBCUTANEOUS EVERY 5 MIN PRN
Status: DISCONTINUED | OUTPATIENT
Start: 2017-12-09 | End: 2017-12-09 | Stop reason: HOSPADM

## 2017-12-09 RX ORDER — ONDANSETRON 2 MG/ML
4 INJECTION INTRAMUSCULAR; INTRAVENOUS EVERY 30 MIN PRN
Status: DISCONTINUED | OUTPATIENT
Start: 2017-12-09 | End: 2017-12-09 | Stop reason: HOSPADM

## 2017-12-09 RX ADMIN — MIDAZOLAM 1 MG: 1 INJECTION INTRAMUSCULAR; INTRAVENOUS at 08:40

## 2017-12-09 RX ADMIN — IBUPROFEN 600 MG: 600 TABLET ORAL at 10:50

## 2017-12-09 RX ADMIN — IBUPROFEN 600 MG: 600 TABLET ORAL at 02:53

## 2017-12-09 RX ADMIN — Medication 0.5 MG: at 09:54

## 2017-12-09 RX ADMIN — SODIUM CHLORIDE, POTASSIUM CHLORIDE, SODIUM LACTATE AND CALCIUM CHLORIDE: 600; 310; 30; 20 INJECTION, SOLUTION INTRAVENOUS at 07:57

## 2017-12-09 RX ADMIN — ONDANSETRON 4 MG: 2 INJECTION INTRAMUSCULAR; INTRAVENOUS at 08:40

## 2017-12-09 RX ADMIN — PROPOFOL 40 MCG/KG/MIN: 10 INJECTION, EMULSION INTRAVENOUS at 08:42

## 2017-12-09 RX ADMIN — OXYCODONE HYDROCHLORIDE 5 MG: 5 TABLET ORAL at 21:13

## 2017-12-09 RX ADMIN — HYDROCODONE BITARTRATE AND ACETAMINOPHEN 1 TABLET: 5; 325 TABLET ORAL at 02:53

## 2017-12-09 RX ADMIN — OXYCODONE HYDROCHLORIDE 5 MG: 5 TABLET ORAL at 14:10

## 2017-12-09 RX ADMIN — IBUPROFEN 600 MG: 600 TABLET ORAL at 23:44

## 2017-12-09 RX ADMIN — ACETAMINOPHEN 650 MG: 325 TABLET, FILM COATED ORAL at 23:44

## 2017-12-09 RX ADMIN — FENTANYL CITRATE 25 MCG: 50 INJECTION, SOLUTION INTRAMUSCULAR; INTRAVENOUS at 08:40

## 2017-12-09 RX ADMIN — SODIUM CHLORIDE, POTASSIUM CHLORIDE, SODIUM LACTATE AND CALCIUM CHLORIDE 1000 ML: 600; 310; 30; 20 INJECTION, SOLUTION INTRAVENOUS at 16:12

## 2017-12-09 RX ADMIN — LIDOCAINE HYDROCHLORIDE,EPINEPHRINE BITARTRATE 5 ML: 20; .005 INJECTION, SOLUTION EPIDURAL; INFILTRATION; INTRACAUDAL; PERINEURAL at 08:29

## 2017-12-09 RX ADMIN — IBUPROFEN 600 MG: 600 TABLET ORAL at 16:25

## 2017-12-09 RX ADMIN — ACETAMINOPHEN 975 MG: 325 TABLET, FILM COATED ORAL at 07:57

## 2017-12-09 RX ADMIN — LIDOCAINE HYDROCHLORIDE,EPINEPHRINE BITARTRATE 5 ML: 20; .005 INJECTION, SOLUTION EPIDURAL; INFILTRATION; INTRACAUDAL; PERINEURAL at 08:39

## 2017-12-09 RX ADMIN — MIDAZOLAM 1 MG: 1 INJECTION INTRAMUSCULAR; INTRAVENOUS at 08:53

## 2017-12-09 RX ADMIN — SENNOSIDES AND DOCUSATE SODIUM 1 TABLET: 8.6; 5 TABLET ORAL at 21:13

## 2017-12-09 RX ADMIN — ACETAMINOPHEN 650 MG: 325 TABLET, FILM COATED ORAL at 16:25

## 2017-12-09 RX ADMIN — LIDOCAINE HYDROCHLORIDE,EPINEPHRINE BITARTRATE 5 ML: 20; .005 INJECTION, SOLUTION EPIDURAL; INFILTRATION; INTRACAUDAL; PERINEURAL at 08:25

## 2017-12-09 RX ADMIN — OXYCODONE HYDROCHLORIDE 5 MG: 5 TABLET ORAL at 11:09

## 2017-12-09 RX ADMIN — LIDOCAINE HYDROCHLORIDE 40 MG: 20 INJECTION, SOLUTION INFILTRATION; PERINEURAL at 08:42

## 2017-12-09 RX ADMIN — OXYCODONE HYDROCHLORIDE 5 MG: 5 TABLET ORAL at 16:56

## 2017-12-09 RX ADMIN — FERROUS SULFATE TAB 325 MG (65 MG ELEMENTAL FE) 325 MG: 325 (65 FE) TAB at 21:13

## 2017-12-09 RX ADMIN — FENTANYL CITRATE 25 MCG: 50 INJECTION, SOLUTION INTRAMUSCULAR; INTRAVENOUS at 08:53

## 2017-12-09 RX ADMIN — CEFAZOLIN SODIUM 2 G: 2 INJECTION, SOLUTION INTRAVENOUS at 08:40

## 2017-12-09 ASSESSMENT — LIFESTYLE VARIABLES: TOBACCO_USE: 0

## 2017-12-09 ASSESSMENT — ENCOUNTER SYMPTOMS: SEIZURES: 0
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
## 2017-12-09 NOTE — PLAN OF CARE
Problem: Patient Care Overview  Goal: Plan of Care/Patient Progress Review  Outcome: No Change  VSS. [place] to pp routines and pre op  Orders, diet , meds,  ect for tonight and tomorrow discussed . FF@. Small rubra flow. Using ice and ticks pads. Norco providing adequate pain control for perineal & uterine cramping.  Breast feeds going well & parents aware of need for bood sugar checks prior to baby feeds.

## 2017-12-09 NOTE — OP NOTE
DATE OF PROCEDURE:  2017        PREOPERATIVE DIAGNOSES:   1.  Intrauterine pregnancy at 39-4/7 weeks.   2.  Suspected fetal macrosomia.      POSTOPERATIVE DIAGNOSES:   1.  Intrauterine pregnancy at 39-4/7 weeks.   2.  Fetal macrosomia.   3.  Shoulder dystocia.   4.  Retained placenta.   5.  Postpartum hemorrhage secondary to uterine atony.   6.  Desires sterilization.      PROCEDURE:   1.  Normal spontaneous vaginal delivery.   2.  Manual removal of the placenta.      ANESTHESIA:  Epidural.      ESTIMATED BLOOD LOSS:  1000 mL      COMPLICATIONS:  None.      SPECIMENS:  Cord blood was drawn.  Cord gases were not sent because the 5-minute Apgar was essentially normal.  She did have retained placenta at the 20-minute lisset, and we will send that for specimen.      DELIVERY SUMMARY:  Chelle Rocha delivered a viable female infant weighing 10 pounds.  The baby was OA position.  There was positive turtle sign.  We called for help.  I attempted downward pressure while we placed the patient in a Phill.  The nurses applied downward pressure at the symphysis pubis to help reduce the anterior shoulder.  I ended up delivering the baby by the posterior arm technique, and the rest of infant delivered without incident afterward.  We then waited 20 minutes for the placenta to detach, but it did not.  I had to place my arm inside the uterus and removed the placenta from the area of the right cornua, where it was fixated.  The placenta did appear to come out  intact and whole, and it will be sent to pathology.  The patient then had uterine atony, and I had to give her Pitocin infusion and fundal massage for a while, but we ended up placing 800 mcg of Cytotec per rectum, and we gave 1 amp of Hemabate.  This effectively contracted the uterus.  She was hemostatic from that point onward.        She plans to the postpartum tubal ligation in the morning, and I will come back for that.  She incurred a first-degree perineal  laceration of the skin involving the labia minora, and this was repaired with 3 simple interrupted stitches of 3-0 Vicryl.        Needle, sponge and instrument counts were correct.  The patient tolerated the procedure well.      DISPOSITION:  The patient is in satisfactory stable condition and is in recovery.         HASEEB RAMIREZ MD             D: 2017 19:21   T: 2017 15:18   MT: #[id number]      Name:     CAL OSORIO   MRN:      [id number]        Account:        [id number]   :      1985           Procedure Date: 2017      Document: [id number]

## 2017-12-09 NOTE — PLAN OF CARE
Problem: Patient Care Overview  Goal: Plan of Care/Patient Progress Review  Outcome: Improving  VSS, working on breast feeding infant, pain controlled with Ibuprofen and Norco, states satisfaction with pain management, up independently with no complaints of dizziness,  at bedside, pneumoboots on patients legs for tubal in the A.M., patient NPO overnight, plans to get Ancef prior to procedure, interacting and bonding well with infant, will continue to monitor.

## 2017-12-09 NOTE — ANESTHESIA CARE TRANSFER NOTE
Patient: Chelle Rocha    Procedure(s):  POST PARTUM TUBAL LIGATION ( NEEDS APPY RETRACTORS) - Wound Class: I-Clean    Diagnosis: post partum tubal  Diagnosis Additional Information: No value filed.    Anesthesia Type:   Epidural     Note:  Airway :Nasal Cannula  Patient transferred to:PACU  Comments: To PACU: Awake/alert, good airway, 02 NC, VSS  Comfortable  Report to RNHandoff Report: Identifed the Patient, Identified the Reponsible Provider, Reviewed the pertinent medical history, Discussed the surgical course, Reviewed Intra-OP anesthesia mangement and issues during anesthesia, Set expectations for post-procedure period and Allowed opportunity for questions and acknowledgement of understanding      Vitals: (Last set prior to Anesthesia Care Transfer)    CRNA VITALS  12/9/2017 0850 - 12/9/2017 0925      12/9/2017             Resp Rate (set): 10                Electronically Signed By: LAURA Mendes CRNA  December 9, 2017  9:25 AM

## 2017-12-09 NOTE — L&D DELIVERY NOTE
"Delivery Summary    Chelle Rocha MRN# [id number]   Age: 31 year old YOB: 1985     ASSESSMENT & PLAN: IUP at 39/4. Fetal macrosomia, Shoulder dystocia, retained placenta, postpartum hemorrhage due to atony, desires sterilization        Rupture date/time:     Rupture type:  Artificial Rupture of Membranes      Delivery/Placenta Date and Time    Delivery Date:  17 Delivery Time:   6:32 PM      Apgars    Living status:  Living    1 Minute 5 Minute 10 Minute 15 Minute 20 Minute   Skin color: 1  1       Heart rate: 2  2       Reflex irritability: 1  2       Muscle tone: 1  1       Respiratory effort: 1  2       Total: 6  8          Apgars assigned by:  JJ CANELA RN/ MARILYN ARRIETA APRN,CNNP      Cord    Vessels:  3 Vessels           Resuscitation    Methods:  Oxygen, NCPAP, Oximetry          Measurements    Weight:  10 lb Length:  1' 10.5\"   Head circumference:  35.6 cm       Labor Events and Shoulder Dystocia    Fetal Tracing Prior to Delivery:  Category 2   Shoulder dystocia present?:  Pos   Anterior shoulder:  right     Help called by:  English    Physician/Provider:     Gentle attempt at traction, assisted by maternal expulsive forces?:  Yes             First maneuver:  Phill maneuver, Suprapubic pressure, Delivery of the posterior arm   Performed by:  English          Delivery (Maternal) (Provider to Complete) (695149)    Episiotomy:  None   Perineal lacerations:  1st Repaired?:  Yes   Vaginal laceration?:  No    Cervical laceration?:  No    Number of repair packets:  1         Mother's Information  Mother: Chelle Rocha #[id number]    Start of Mother's Information     IO Blood Loss  17 0632 - 17 1916    Mom's I/O Activity            End of Mother's Information  Mother: Chelle Rocha #[id number]            Delivery - Provider to Complete (286130)    Delivering clinician:  HASEEB RAMIREZ   Attempted Delivery Types (Choose all that apply):  " Spontaneous Vaginal Delivery   Delivery Type (Choose the 1 that will go to the Birth History):  Vaginal, Spontaneous Delivery                           Placenta    Immediate Cord Clamping:  Done   Removal:  Manual Removal   Comments:  CAN x 1   Disposition:  Pathology      Anesthesia    Method:  Epidural   Cervical dilation at placement:  4-7         Presentation and Position    Presentation:  Vertex    Occiput Anterior                    Tu Amanda MD

## 2017-12-09 NOTE — BRIEF OP NOTE
[facility] Brief Operative Note    Pre-operative diagnosis: post partum tubal   Post-operative diagnosis PPD#1 S/P , Desires sterilization     Procedure: Procedure(s):  POST PARTUM TUBAL LIGATION ( NEEDS APPY RETRACTORS) - Wound Class: I-Clean   Surgeon(s): Surgeon(s) and Role:     * Tu Amanda MD - Primary   Estimated blood loss: 1 mL    Specimens:   ID Type Source Tests Collected by Time Destination   A : left fallopian tube Tissue Fallopian Tube, Left SURGICAL PATHOLOGY EXAM Tu Amanda MD 2017  9:00 AM    B : right fallopian tube Tissue Fallopian Tube, Right SURGICAL PATHOLOGY EXAM Tu Amanda MD 2017  9:04 AM       Findings: NOrmal tubal anatomy. I tried to repair umbilical hernia somwhat with 0 Ethilon

## 2017-12-09 NOTE — PROVIDER NOTIFICATION
"At 1530, pt c/o feeling \"lightheaded\" and \"weak\".  States it \"took all my strength to open my apple juice\".  VSS.  Pt has eaten and been drinking fluids.  MD notified.  Orders for CBC and LR bolus after lab draw.  Will update MD in 1 hour.  "

## 2017-12-09 NOTE — PROVIDER NOTIFICATION
Notified Dr. Amanda @ 4011 of pt's request for pain medication.  Norco not working well and pt had received 975mg of tylenol pre-op.  New orders for oxy received.  MD also notified of hgb 8.5 and pt asymptomatic - no new orders, will monitor pt.  MD ok to stop IVF, check VS Q30M x2, then return to normal PP order set.

## 2017-12-09 NOTE — ANESTHESIA POSTPROCEDURE EVALUATION
Patient: Chelle Rocha    Procedure(s):  POST PARTUM TUBAL LIGATION ( NEEDS APPY RETRACTORS) - Wound Class: I-Clean    Diagnosis:post partum tubal  Diagnosis Additional Information: No value filed.    Anesthesia Type:  Epidural    Note:  Anesthesia Post Evaluation    Patient location during evaluation: PACU  Patient participation: Able to fully participate in evaluation  Level of consciousness: awake and alert  Pain management: satisfactory to patient  Airway patency: patent  Cardiovascular status: hemodynamically stable  Respiratory status: acceptable and unassisted  Hydration status: balanced  PONV: none     Anesthetic complications: None          Last vitals:  Vitals:    12/09/17 1032 12/09/17 1050 12/09/17 1100   BP: 126/79  122/78   Resp: 16  16   Temp: 36.9  C (98.5  F)     SpO2: 94% 95% 95%         Electronically Signed By: Filiberto Layne MD  December 9, 2017  12:53 PM

## 2017-12-09 NOTE — ANESTHESIA POSTPROCEDURE EVALUATION
Patient: Chelle Rocha    * No procedures listed *    Diagnosis:* No pre-op diagnosis entered *  Diagnosis Additional Information: No value filed.    Anesthesia Type:  No value filed.    Note:  Anesthesia Post Evaluation    Patient location during evaluation: bedside  Patient participation: Able to fully participate in evaluation  Level of consciousness: awake  Pain management: adequate  Airway patency: patent  Cardiovascular status: acceptable  Respiratory status: acceptable  Hydration status: acceptable  PONV: none     Anesthetic complications: None    Comments: No anesthetic complications noted. Denies HA. Motor/Sensation intact bilaterally. Mild soreness at insertion site. Pleased with epidural management.         Last vitals:  Vitals:    12/09/17 1200 12/09/17 1435 12/09/17 1530   BP:  118/67 117/76   Resp: 16 16 16   Temp:  36.6  C (97.8  F)    SpO2:            Electronically Signed By: Stepan Phoenix DO, DO  December 9, 2017  3:58 PM

## 2017-12-10 VITALS
SYSTOLIC BLOOD PRESSURE: 137 MMHG | RESPIRATION RATE: 16 BRPM | OXYGEN SATURATION: 95 % | WEIGHT: 180 LBS | DIASTOLIC BLOOD PRESSURE: 85 MMHG | TEMPERATURE: 97.4 F | BODY MASS INDEX: 33.99 KG/M2 | HEIGHT: 61 IN

## 2017-12-10 PROCEDURE — 25000132 ZZH RX MED GY IP 250 OP 250 PS 637: Performed by: OBSTETRICS & GYNECOLOGY

## 2017-12-10 RX ORDER — FERROUS SULFATE 325(65) MG
325 TABLET ORAL 2 TIMES DAILY
Qty: 100 TABLET | Refills: 0 | Status: ON HOLD | OUTPATIENT
Start: 2017-12-10 | End: 2018-01-24

## 2017-12-10 RX ORDER — IBUPROFEN 600 MG/1
600 TABLET, FILM COATED ORAL EVERY 6 HOURS PRN
Qty: 120 TABLET | Refills: 0 | Status: ON HOLD | OUTPATIENT
Start: 2017-12-10 | End: 2018-01-24

## 2017-12-10 RX ORDER — OXYCODONE HYDROCHLORIDE 5 MG/1
5-10 TABLET ORAL
Qty: 30 TABLET | Refills: 0 | Status: ON HOLD | OUTPATIENT
Start: 2017-12-10 | End: 2018-01-24

## 2017-12-10 RX ADMIN — OXYCODONE HYDROCHLORIDE 5 MG: 5 TABLET ORAL at 05:13

## 2017-12-10 RX ADMIN — ACETAMINOPHEN 650 MG: 325 TABLET, FILM COATED ORAL at 05:13

## 2017-12-10 RX ADMIN — OXYCODONE HYDROCHLORIDE 5 MG: 5 TABLET ORAL at 01:21

## 2017-12-10 RX ADMIN — OXYCODONE HYDROCHLORIDE 5 MG: 5 TABLET ORAL at 14:38

## 2017-12-10 RX ADMIN — IBUPROFEN 600 MG: 600 TABLET ORAL at 12:28

## 2017-12-10 RX ADMIN — FERROUS SULFATE TAB 325 MG (65 MG ELEMENTAL FE) 325 MG: 325 (65 FE) TAB at 08:35

## 2017-12-10 RX ADMIN — IBUPROFEN 600 MG: 600 TABLET ORAL at 05:13

## 2017-12-10 RX ADMIN — SENNOSIDES AND DOCUSATE SODIUM 2 TABLET: 8.6; 5 TABLET ORAL at 08:35

## 2017-12-10 RX ADMIN — OXYCODONE HYDROCHLORIDE 5 MG: 5 TABLET ORAL at 10:30

## 2017-12-10 RX ADMIN — ACETAMINOPHEN 650 MG: 325 TABLET, FILM COATED ORAL at 12:29

## 2017-12-10 NOTE — PROGRESS NOTES
Patient scored a 3 on question 10 of [place] Depression Screen.  She did not realize questionnaire was for last 7 days only.  She has not had any current or recent thoughts of self harm.

## 2017-12-10 NOTE — LACTATION NOTE
Initial visit.   Breastfeeding handout given.   Advised to breastfeed exclusively, on demand, avoid pacifiers, bottles and formula unless medically indicated.  Encouraged rooming in, skin to skin, feeding on demand 8-12x/day or sooner if baby cues.  Explained benefits of holding and skin to skin.  Encouraged lots of skin to skin.   Continues to nurse well per mom. Getting ready for discharge.  Plan: Watch for feeding cues and feed every 2-3 hours and/or on demand. Continue to use feeding log to track intake and appropriate voids and stools. Take feeding log to first follow up appointment or weight check. Encourage skin to skin to promote frequent feedings, thermoregulation and bonding. Follow-up with healthcare provider or lactation consultant for questions or concerns.    Mother has breast augmentation. History of low milk supply, plans on breast and bottling.  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     Will follow as needed.   Carmen Cisneros RNC, IBCLC

## 2017-12-10 NOTE — DISCHARGE INSTRUCTIONS
Postpartum Vaginal Delivery Instructions    Activity       Ask family and friends for help when you need it.    Do not place anything in your vagina for 6 weeks.    You are not restricted on other activities, but take it easy for a few weeks to allow your body to recover from delivery.  You are able to do any activities you feel up to that point.    No driving until you have stopped taking your pain medications (usually two weeks after delivery).     Call your health care provider if you have any of these symptoms:       Increased pain, swelling, redness, or fluid around your stiches from an episiotomy or perineal tear.    A fever above 100.4 F (38 C) with or without chills when placing a thermometer under your tongue.    You soak a sanitary pad with blood within 1 hour, or you see blood clots larger than a golf ball.    Bleeding that lasts more than 6 weeks.    Vaginal discharge that smells bad.    Severe pain, cramping or tenderness in your lower belly area.    A need to urinate more frequently (use the toilet more often), more urgently (use the toilet very quickly), or it burns when you urinate.    Nausea and vomiting.    Redness, swelling or pain around a vein in your leg.    Problems breastfeeding or a red or painful area on your breast.    Chest pain and cough or are gasping for air.    Problems coping with sadness, anxiety, or depression.  If you have any concerns about hurting yourself or the baby, call your provider immediately.     You have questions or concerns after you return home.     Keep your hands clean:  Always wash your hands before touching your perineal area and stitches.  This helps reduce your risk of infection.  If your hands aren't dirty, you may use an alcohol hand-rub to clean your hands. Keep your nails clean and short.        Discharge Instructions    You should set up an appointment to see your doctor in 6-8 weeks after delivery for a postpartum exam.   You should also call if you  develop any heavy vaginal bleeding worse than a menstrual period, or fever over 100.5 degrees, or vomiting or increased pain.    You should abstain from intercourse for six weeks after delivery. You should avoid a bath for 1 week after delivery but showering is ok. If you have any questions about yourself or the baby, feel free to call or if any urgent concerns after hours, please go to the emergency room.    Mami Locke MD

## 2017-12-10 NOTE — PROGRESS NOTES
SW:  SW met with patient after receiving consult for scoring an 11 on her depression scale.  Patient was aware of her elevated depression score and says that she has been feeling okay.  Baby is patient's forth child and has two boys ages 10 and 8 and one other daughter age 4.  Patient denies any suicidal ideations and has not had any suicidal thoughts since July.  Patient states that she feels supported at home and lives with her boyfriend who will be off from work for a month.  He will then return to work on the weekends only and will be able to assist patient during the week.  Patient explains that she has a diagnosis of bipolar disorder and has not taken any medications to manage her diagnosis for 1.5 years.  She feels that she has other coping skills to manage her depression and enjoys coloring as a main coping skill.  She reports that she had post partum depression after her second child and is aware of what that feels like.   offered patient resources and advised to follow up with her primary physician if she is experiencing any depression signs and symptoms.  Patient also talked about going to see a therapist again when she get home and settled with her baby but wasn't sure what her insurance would cover and was going to check into that.  SW provided resources for therapists to patient as well.  SW offered support for patient and to reach out if she had any questions or concerns before discharging.    P: Will continue to follow as needed.    Clarence Mcmillan, MSW, LGSW

## 2017-12-10 NOTE — PLAN OF CARE
Problem: Patient Care Overview  Goal: Plan of Care/Patient Progress Review  Outcome: Adequate for Discharge Date Met: 12/10/17  D: VSS, assessments WDL.   I: Pt. received complete discharge paperwork and home medications as filled by discharge pharmacy.  Pt. was given times of last dose for all discharge medications in writing on discharge medication sheets.  Discharge teaching included home medication, pain management, activity restrictions, postpartum cares, and signs and symptoms of infection.    A: Discharge outcomes on care plan met.  Mother states understanding and comfort with self and baby cares.  P: Pt. discharged to home.  Pt. was discharged with baby, and bands were checked at time of discharge.  Pt. was accompanied by , nurse and baby, and left with personal belongings.    Pt. to follow up with OB per MD order.  Pt. had no further questions at the time of discharge and no unmet needs were identified.    Patient also had a tubal ligation with a laprascopic incision.  Given instruction of what to watch for and when to call doctor for notable symptoms.

## 2017-12-10 NOTE — PLAN OF CARE
"Problem: Patient Care Overview  Goal: Plan of Care/Patient Progress Review  Outcome: No Change  Pt had tubal at beginning of this shift.  Returned from surgery and is doing well.  Pt is ambulating in room and voiding without difficulty.  States pain is well controlled with tylenol, ibuprofen, and oxycodone.  Tolerating regular diet and PO fluids.  Pt had one episode of feeling lightheaded and \"weak\".  MD was notified.  Most recent hgb is 8.0 and MD is aware.  IV infiltrated during IVF bolus.  Encouraged increased PO fluid intake.  PO Fe scheduled to start this evening.  Independent with breastfeeding and baby cares.  Bleeding is minimal.  Cont to monitor.       "

## 2017-12-10 NOTE — OP NOTE
DATE OF PROCEDURE:  12/09/2017      POSTOPERATIVE DIAGNOSES:   1.  Postpartum day #1 status post normal spontaneous vaginal delivery.   2.  Desires sterilization.      POSTOPERATIVE DIAGNOSES:   1.  Postpartum day #1 status post normal spontaneous vaginal delivery.   2.  Desires sterilization.      PROCEDURE:  Postpartum tubal ligation.      SURGEON:  Tu Amanda MD      ASSISTANT:  None.      ANESTHESIA:  Epidural and sedation.      ESTIMATED BLOOD LOSS:  1  mL.      COMPLICATIONS:  None.      SPECIMENS:  Left tube and right tube sent to pathology.      FINDINGS:  The patient had umbilical hernia and it has extended into a diastasis recti that Dr. Diego Golden will eventually attend to in the summer after her abdominal wall resolves status post pregnancy.  I did attempt to reapproximate her umbilicus somewhat using Ethilon but likely more work has to be done.      Her anatomy was normal and I took as much of the fallopian tube as I could safely see to reduce her cancer risk.      DESCRIPTION OF OPERATIVE PROCEDURE:  After obtaining informed consent, the patient was prepped and draped in the usual manner for an abdominal procedure.  A scalpel was used to recreate a midline skin incision in the umbilicus.  This was carried down to the level of the fascia.  Fascia was entered with Herrera scissors and extended superiorly and inferiorly.  We used appendectomy retractors and Babcocks to isolate the left fallopian tube.  It was elevated outside of the incision.  I entered the mesosalpinx with electrocautery electrocoagulating vasculature when it was encountered.  I then tied the proximal and distal end and then I believe I included most of the fimbriated end in my ties.  The tube was then transected looked for bleeding found none and sent the specimen off the field.  A similar procedure was carried out on the patient's right fallopian tube and without difficulty.  At this point, the tubes had sunk back into the abdomen and  we closed the fascia with 0 Ethilon in a running stitch.  Skin was closed with 4-0 Monocryl in a running stitch.  The skin was sealed with Dermabond.  Needle, sponge and instrument counts were correct.  The patient tolerated the procedure well.      DISPOSITION:  The patient is in satisfactory stable condition and is in recovery.         HASEEB RAMIREZ MD             D: 2017 09:18   T: 2017 19:15   MT: [id number]      Name:     CAL OSORIO   MRN:      [id number]        Account:        [id number]   :      1985           Procedure Date: 2017      Document: [id number]

## 2017-12-10 NOTE — PROGRESS NOTES
"Chelle Rocha  December 10, 2017  PPD#2 s/p , POD#1 s/p PPTL    S: Pt doing well with no acute events overnight.  Pain well controlled;  ambulating without difficulty; tolerating po intake; passing flatus.  Denies SOB, CP, HA, nausea/vomiting, fevers/chills.  Decreased lochia.  Breastfeeding without difficulty.  She reports some mild to moderate pain s/p post-partum tubal ligation.  She is otherwise doing very well.  Desires discharge to home.  She denies dizziness or extreme fatigue.     O: /85 (BP Location: Left arm)  Temp 97.4  F (36.3  C) (Oral)  Resp 16  Ht 1.549 m (5' 1\")  Wt 81.6 kg (180 lb)  SpO2 95%  Breastfeeding? Unknown  BMI 34.01 kg/m2    Recent Labs  Lab 17  1600 17  0800   HGB 8.0* 8.5*     Abdomen: soft, non tender, fundus firm 2 cm below the umbilicus, incision clean/dry/intact  Ext: non tender, no edema or erythema    A/P: s/p  PPD #2 - doing well  Continue routine post partum care  Discussed contraceptive options, which will be readdressed at 6 week post-partum appointment.  Discharge planning for today.    Mami Locke MD    "

## 2017-12-10 NOTE — PLAN OF CARE
Problem: Patient Care Overview  Goal: Plan of Care/Patient Progress Review  Outcome: No Change  VSS.  Pain well controlled, requesting prn pain medications as needed.  Up independently in room.  Working on breastfeeding  and  cares. On pathway. Continue to monitor and notify MD as needed.

## 2017-12-11 LAB
ABO + RH BLD: NORMAL
ABO + RH BLD: NORMAL
BLOOD BANK CMNT PATIENT-IMP: NORMAL
DATE RH IMM GL GVN: NORMAL
FETAL CELL SCN BLD QL ROSETTE: NORMAL
RH IG VIALS RECOM PATIENT: NORMAL

## 2017-12-12 LAB — COPATH REPORT: NORMAL

## 2017-12-13 NOTE — DISCHARGE SUMMARY
[facility] Discharge Summary    Chelle Rocha MRN# [id number]   Age: 32 year old YOB: 1985     Date of Admission:  2017  Date of Discharge::  12/10/2017  2:58 PM  Admitting Physician:  Tu Amanda MD  Discharge Physician:  Tu Amanda MD               Admission Diagnoses:   IUP at 39/4 weeks  Suspected fetal macrosomia  Desires sterilization            Discharge Diagnosis:   1.  Intrauterine pregnancy at 39-4/7 weeks.   2.  Fetal macrosomia.   3.  Shoulder dystocia.   4.  Retained placenta.   5.  Postpartum hemorrhage secondary to uterine atony.   6.  Desires sterilization.           Procedures:   Procedure(s):   Manual removal of retained placenta  PPTL       No other procedures performed during this admission           Medications Prior to Admission:     No prescriptions prior to admission.             Discharge Medications:     Discharge Medication List as of 12/10/2017  1:34 PM      START taking these medications    Details   ferrous sulfate (IRON) 325 (65 FE) MG tablet Take 1 tablet (325 mg) by mouth 2 times daily, Disp-100 tablet, R-0, Local Print      ibuprofen (ADVIL/MOTRIN) 600 MG tablet Take 1 tablet (600 mg) by mouth every 6 hours as needed for other (cramping), Disp-120 tablet, R-0, Local Print      oxyCODONE IR (ROXICODONE) 5 MG tablet Take 1-2 tablets (5-10 mg) by mouth every 3 hours as needed for moderate to severe pain, Disp-30 tablet, R-0, Local Print         CONTINUE these medications which have NOT CHANGED    Details   Prenatal Vit-Fe Fumarate-FA (PRENATAL MULTIVITAMIN PLUS IRON) 27-0.8 MG TABS per tablet Take 1 tablet by mouth daily, Historical      RaNITidine HCl (ZANTAC PO) Take 75 mg by mouth 2 times daily, Historical                   Consultations:   No consultations were requested during this admission          Brief History of Illness:   Reason for admission requiring a surgical or invasive procedure:   Pregnancy with retained placenta, and  desires steriliation   The patient underwent the following procedure(s):     Manual removal of retained placenta  PPTL   There were no immediate complications during this procedure.    Please refer to the full operative summary for details.             Hospital Course:   The patient's hospital course was unremarkable.  She recovered as anticipated and experienced no post-operative complications.           Discharge Instructions and Follow-Up:   Discharge diet: Regular   Discharge activity: No heavy lifting, pushing, pulling for 6 week(s)  Pelvic rest: abstain from intercourse and do not use tampons for 6 week(s)   Discharge follow-up: Follow up with Dr. Amanda in 2 weeks   Wound care: Keep wound clean and dry           Discharge Disposition:   Discharged to home      Attestation:  I have reviewed today's vital signs, notes, medications, labs and imaging.  Amount of time performed on this discharge summary: 20 minutes.    Tu Amanda MD

## 2018-01-24 ENCOUNTER — ANESTHESIA (OUTPATIENT)
Dept: SURGERY | Facility: CLINIC | Age: 33
End: 2018-01-24
Payer: COMMERCIAL

## 2018-01-24 ENCOUNTER — ANESTHESIA EVENT (OUTPATIENT)
Dept: SURGERY | Facility: CLINIC | Age: 33
End: 2018-01-24
Payer: COMMERCIAL

## 2018-01-24 ENCOUNTER — APPOINTMENT (OUTPATIENT)
Dept: GENERAL RADIOLOGY | Facility: CLINIC | Age: 33
End: 2018-01-24
Attending: SURGERY
Payer: COMMERCIAL

## 2018-01-24 ENCOUNTER — HOSPITAL ENCOUNTER (OUTPATIENT)
Facility: CLINIC | Age: 33
LOS: 1 days | Discharge: HOME | End: 2018-01-24
Attending: SURGERY | Admitting: SURGERY
Payer: COMMERCIAL

## 2018-01-24 ENCOUNTER — SURGERY (OUTPATIENT)
Age: 33
End: 2018-01-24
Payer: COMMERCIAL

## 2018-01-24 ENCOUNTER — TRANSFERRED RECORDS (OUTPATIENT)
Dept: HEALTH INFORMATION MANAGEMENT | Facility: CLINIC | Age: 33
End: 2018-01-24

## 2018-01-24 VITALS
OXYGEN SATURATION: 93 % | RESPIRATION RATE: 16 BRPM | DIASTOLIC BLOOD PRESSURE: 90 MMHG | BODY MASS INDEX: 28.13 KG/M2 | SYSTOLIC BLOOD PRESSURE: 137 MMHG | HEIGHT: 61 IN | TEMPERATURE: 97.5 F | WEIGHT: 149 LBS | HEART RATE: 57 BPM

## 2018-01-24 DIAGNOSIS — K80.20 CALCULUS OF GALLBLADDER WITHOUT CHOLECYSTITIS WITHOUT OBSTRUCTION: Primary | ICD-10-CM

## 2018-01-24 LAB
ALT SERPL-CCNC: 63 U/L (ref 14–63)
AST SERPL-CCNC: 98 U/L (ref 15–37)
CREAT SERPL-MCNC: 0.77 MG/DL (ref 0.51–0.95)
GFR SERPL CREATININE-BSD FRML MDRD: >60 ML/MIN/1.73ME2 (ref 60–150)
GLUCOSE SERPL-MCNC: 132 MG/DL (ref 74–100)
POTASSIUM SERPL-SCNC: 3.8 MMOL/L (ref 3.5–5.1)

## 2018-01-24 PROCEDURE — 25000128 H RX IP 250 OP 636: Performed by: ANESTHESIOLOGY

## 2018-01-24 PROCEDURE — 27210794 ZZH OR GENERAL SUPPLY STERILE: Performed by: SURGERY

## 2018-01-24 PROCEDURE — 71000012 ZZH RECOVERY PHASE 1 LEVEL 1 FIRST HR: Performed by: SURGERY

## 2018-01-24 PROCEDURE — 37000009 ZZH ANESTHESIA TECHNICAL FEE, EACH ADDTL 15 MIN: Performed by: SURGERY

## 2018-01-24 PROCEDURE — 25000132 ZZH RX MED GY IP 250 OP 250 PS 637: Performed by: PHYSICIAN ASSISTANT

## 2018-01-24 PROCEDURE — 36000093 ZZH SURGERY LEVEL 4 1ST 30 MIN: Performed by: SURGERY

## 2018-01-24 PROCEDURE — 25000125 ZZHC RX 250: Performed by: NURSE ANESTHETIST, CERTIFIED REGISTERED

## 2018-01-24 PROCEDURE — 88304 TISSUE EXAM BY PATHOLOGIST: CPT | Performed by: SURGERY

## 2018-01-24 PROCEDURE — 37000008 ZZH ANESTHESIA TECHNICAL FEE, 1ST 30 MIN: Performed by: SURGERY

## 2018-01-24 PROCEDURE — 25800025 ZZH RX 258: Performed by: SURGERY

## 2018-01-24 PROCEDURE — 47563 LAPARO CHOLECYSTECTOMY/GRAPH: CPT | Mod: AS | Performed by: PHYSICIAN ASSISTANT

## 2018-01-24 PROCEDURE — 71000013 ZZH RECOVERY PHASE 1 LEVEL 1 EA ADDTL HR: Performed by: SURGERY

## 2018-01-24 PROCEDURE — 40000935 ZZH STATISTIC OUTPATIENT (NON-OBS) EVE

## 2018-01-24 PROCEDURE — 88304 TISSUE EXAM BY PATHOLOGIST: CPT | Mod: 26 | Performed by: SURGERY

## 2018-01-24 PROCEDURE — 25000128 H RX IP 250 OP 636: Performed by: SURGERY

## 2018-01-24 PROCEDURE — 27210995 ZZH RX 272: Performed by: SURGERY

## 2018-01-24 PROCEDURE — 25000128 H RX IP 250 OP 636: Performed by: NURSE ANESTHETIST, CERTIFIED REGISTERED

## 2018-01-24 PROCEDURE — 40000169 ZZH STATISTIC PRE-PROCEDURE ASSESSMENT I: Performed by: SURGERY

## 2018-01-24 PROCEDURE — 47563 LAPARO CHOLECYSTECTOMY/GRAPH: CPT | Performed by: SURGERY

## 2018-01-24 PROCEDURE — 25000125 ZZHC RX 250: Performed by: SURGERY

## 2018-01-24 PROCEDURE — 36000063 ZZH SURGERY LEVEL 4 EA 15 ADDTL MIN: Performed by: SURGERY

## 2018-01-24 PROCEDURE — 40000277 XR SURGERY CARM FLUORO LESS THAN 5 MIN W STILLS

## 2018-01-24 PROCEDURE — 25000128 H RX IP 250 OP 636: Performed by: PHYSICIAN ASSISTANT

## 2018-01-24 PROCEDURE — 99203 OFFICE O/P NEW LOW 30 MIN: CPT | Mod: 57 | Performed by: SURGERY

## 2018-01-24 RX ORDER — MAGNESIUM HYDROXIDE/ALUMINUM HYDROXICE/SIMETHICONE 120; 1200; 1200 MG/30ML; MG/30ML; MG/30ML
10-15 SUSPENSION ORAL EVERY 4 HOURS PRN
COMMUNITY
End: 2018-04-19

## 2018-01-24 RX ORDER — PROPOFOL 10 MG/ML
INJECTION, EMULSION INTRAVENOUS CONTINUOUS PRN
Status: DISCONTINUED | OUTPATIENT
Start: 2018-01-24 | End: 2018-01-24

## 2018-01-24 RX ORDER — ACETAMINOPHEN 325 MG/1
650 TABLET ORAL EVERY 6 HOURS PRN
Status: DISCONTINUED | OUTPATIENT
Start: 2018-01-24 | End: 2018-01-24 | Stop reason: HOSPADM

## 2018-01-24 RX ORDER — DEXAMETHASONE SODIUM PHOSPHATE 4 MG/ML
INJECTION, SOLUTION INTRA-ARTICULAR; INTRALESIONAL; INTRAMUSCULAR; INTRAVENOUS; SOFT TISSUE PRN
Status: DISCONTINUED | OUTPATIENT
Start: 2018-01-24 | End: 2018-01-24

## 2018-01-24 RX ORDER — PROCHLORPERAZINE MALEATE 10 MG
10 TABLET ORAL EVERY 6 HOURS PRN
Status: DISCONTINUED | OUTPATIENT
Start: 2018-01-24 | End: 2018-01-24 | Stop reason: HOSPADM

## 2018-01-24 RX ORDER — NALOXONE HYDROCHLORIDE 0.4 MG/ML
.1-.4 INJECTION, SOLUTION INTRAMUSCULAR; INTRAVENOUS; SUBCUTANEOUS
Status: DISCONTINUED | OUTPATIENT
Start: 2018-01-24 | End: 2018-01-24 | Stop reason: HOSPADM

## 2018-01-24 RX ORDER — FENTANYL CITRATE 50 UG/ML
25-50 INJECTION, SOLUTION INTRAMUSCULAR; INTRAVENOUS
Status: DISCONTINUED | OUTPATIENT
Start: 2018-01-24 | End: 2018-01-24 | Stop reason: HOSPADM

## 2018-01-24 RX ORDER — NEOSTIGMINE METHYLSULFATE 1 MG/ML
VIAL (ML) INJECTION PRN
Status: DISCONTINUED | OUTPATIENT
Start: 2018-01-24 | End: 2018-01-24

## 2018-01-24 RX ORDER — GLYCOPYRROLATE 0.2 MG/ML
INJECTION, SOLUTION INTRAMUSCULAR; INTRAVENOUS PRN
Status: DISCONTINUED | OUTPATIENT
Start: 2018-01-24 | End: 2018-01-24

## 2018-01-24 RX ORDER — HYDROMORPHONE HYDROCHLORIDE 1 MG/ML
.3-.5 INJECTION, SOLUTION INTRAMUSCULAR; INTRAVENOUS; SUBCUTANEOUS EVERY 10 MIN PRN
Status: DISCONTINUED | OUTPATIENT
Start: 2018-01-24 | End: 2018-01-24 | Stop reason: HOSPADM

## 2018-01-24 RX ORDER — PROPOFOL 10 MG/ML
INJECTION, EMULSION INTRAVENOUS PRN
Status: DISCONTINUED | OUTPATIENT
Start: 2018-01-24 | End: 2018-01-24

## 2018-01-24 RX ORDER — HYDROMORPHONE HYDROCHLORIDE 1 MG/ML
0.2 INJECTION, SOLUTION INTRAMUSCULAR; INTRAVENOUS; SUBCUTANEOUS
Status: DISCONTINUED | OUTPATIENT
Start: 2018-01-24 | End: 2018-01-24 | Stop reason: HOSPADM

## 2018-01-24 RX ORDER — NALOXONE HYDROCHLORIDE 0.4 MG/ML
.1-.4 INJECTION, SOLUTION INTRAMUSCULAR; INTRAVENOUS; SUBCUTANEOUS
Status: DISCONTINUED | OUTPATIENT
Start: 2018-01-24 | End: 2018-01-24

## 2018-01-24 RX ORDER — SODIUM CHLORIDE 9 MG/ML
INJECTION, SOLUTION INTRAVENOUS CONTINUOUS
Status: DISCONTINUED | OUTPATIENT
Start: 2018-01-24 | End: 2018-01-24 | Stop reason: HOSPADM

## 2018-01-24 RX ORDER — SODIUM CHLORIDE, SODIUM LACTATE, POTASSIUM CHLORIDE, CALCIUM CHLORIDE 600; 310; 30; 20 MG/100ML; MG/100ML; MG/100ML; MG/100ML
INJECTION, SOLUTION INTRAVENOUS CONTINUOUS
Status: DISCONTINUED | OUTPATIENT
Start: 2018-01-24 | End: 2018-01-24 | Stop reason: HOSPADM

## 2018-01-24 RX ORDER — FENTANYL CITRATE 50 UG/ML
INJECTION, SOLUTION INTRAMUSCULAR; INTRAVENOUS PRN
Status: DISCONTINUED | OUTPATIENT
Start: 2018-01-24 | End: 2018-01-24

## 2018-01-24 RX ORDER — CEFAZOLIN SODIUM 1 G
1 VIAL (EA) INJECTION SEE ADMIN INSTRUCTIONS
Status: DISCONTINUED | OUTPATIENT
Start: 2018-01-24 | End: 2018-01-24 | Stop reason: HOSPADM

## 2018-01-24 RX ORDER — ONDANSETRON 4 MG/1
4 TABLET, ORALLY DISINTEGRATING ORAL EVERY 6 HOURS PRN
Status: DISCONTINUED | OUTPATIENT
Start: 2018-01-24 | End: 2018-01-24 | Stop reason: HOSPADM

## 2018-01-24 RX ORDER — OXYCODONE HYDROCHLORIDE 5 MG/1
5 TABLET ORAL
Status: DISCONTINUED | OUTPATIENT
Start: 2018-01-24 | End: 2018-01-24

## 2018-01-24 RX ORDER — KETOROLAC TROMETHAMINE 30 MG/ML
INJECTION, SOLUTION INTRAMUSCULAR; INTRAVENOUS PRN
Status: DISCONTINUED | OUTPATIENT
Start: 2018-01-24 | End: 2018-01-24

## 2018-01-24 RX ORDER — ONDANSETRON 2 MG/ML
INJECTION INTRAMUSCULAR; INTRAVENOUS PRN
Status: DISCONTINUED | OUTPATIENT
Start: 2018-01-24 | End: 2018-01-24

## 2018-01-24 RX ORDER — ONDANSETRON 2 MG/ML
4 INJECTION INTRAMUSCULAR; INTRAVENOUS EVERY 30 MIN PRN
Status: DISCONTINUED | OUTPATIENT
Start: 2018-01-24 | End: 2018-01-24 | Stop reason: HOSPADM

## 2018-01-24 RX ORDER — LIDOCAINE HYDROCHLORIDE 20 MG/ML
INJECTION, SOLUTION INFILTRATION; PERINEURAL PRN
Status: DISCONTINUED | OUTPATIENT
Start: 2018-01-24 | End: 2018-01-24

## 2018-01-24 RX ORDER — OXYCODONE HYDROCHLORIDE 5 MG/1
5-10 TABLET ORAL
Status: DISCONTINUED | OUTPATIENT
Start: 2018-01-24 | End: 2018-01-24 | Stop reason: HOSPADM

## 2018-01-24 RX ORDER — MAGNESIUM HYDROXIDE 1200 MG/15ML
LIQUID ORAL PRN
Status: DISCONTINUED | OUTPATIENT
Start: 2018-01-24 | End: 2018-01-24 | Stop reason: HOSPADM

## 2018-01-24 RX ORDER — LIDOCAINE 40 MG/G
CREAM TOPICAL
Status: DISCONTINUED | OUTPATIENT
Start: 2018-01-24 | End: 2018-01-24 | Stop reason: HOSPADM

## 2018-01-24 RX ORDER — DIPHENHYDRAMINE HYDROCHLORIDE 50 MG/ML
12.5 INJECTION INTRAMUSCULAR; INTRAVENOUS 2 TIMES DAILY PRN
Status: DISCONTINUED | OUTPATIENT
Start: 2018-01-24 | End: 2018-01-24 | Stop reason: HOSPADM

## 2018-01-24 RX ORDER — ONDANSETRON 2 MG/ML
4 INJECTION INTRAMUSCULAR; INTRAVENOUS EVERY 6 HOURS PRN
Status: DISCONTINUED | OUTPATIENT
Start: 2018-01-24 | End: 2018-01-24 | Stop reason: HOSPADM

## 2018-01-24 RX ORDER — IOPAMIDOL 612 MG/ML
INJECTION, SOLUTION INTRATHECAL PRN
Status: DISCONTINUED | OUTPATIENT
Start: 2018-01-24 | End: 2018-01-24 | Stop reason: HOSPADM

## 2018-01-24 RX ORDER — ONDANSETRON 4 MG/1
4 TABLET, ORALLY DISINTEGRATING ORAL EVERY 30 MIN PRN
Status: DISCONTINUED | OUTPATIENT
Start: 2018-01-24 | End: 2018-01-24 | Stop reason: HOSPADM

## 2018-01-24 RX ORDER — OXYCODONE HYDROCHLORIDE 5 MG/1
5-10 TABLET ORAL
Qty: 20 TABLET | Refills: 0 | Status: SHIPPED | OUTPATIENT
Start: 2018-01-24 | End: 2018-04-19

## 2018-01-24 RX ORDER — MEPERIDINE HYDROCHLORIDE 25 MG/ML
12.5 INJECTION INTRAMUSCULAR; INTRAVENOUS; SUBCUTANEOUS
Status: DISCONTINUED | OUTPATIENT
Start: 2018-01-24 | End: 2018-01-24 | Stop reason: HOSPADM

## 2018-01-24 RX ADMIN — GLYCOPYRROLATE 0.2 MG: 0.2 INJECTION, SOLUTION INTRAMUSCULAR; INTRAVENOUS at 12:39

## 2018-01-24 RX ADMIN — DIPHENHYDRAMINE HYDROCHLORIDE 12.5 MG: 50 INJECTION, SOLUTION INTRAMUSCULAR; INTRAVENOUS at 14:45

## 2018-01-24 RX ADMIN — MIDAZOLAM 2 MG: 1 INJECTION INTRAMUSCULAR; INTRAVENOUS at 12:20

## 2018-01-24 RX ADMIN — PROPOFOL 150 MCG/KG/MIN: 10 INJECTION, EMULSION INTRAVENOUS at 12:22

## 2018-01-24 RX ADMIN — ONDANSETRON 4 MG: 2 INJECTION INTRAMUSCULAR; INTRAVENOUS at 12:34

## 2018-01-24 RX ADMIN — SODIUM CHLORIDE: 9 INJECTION, SOLUTION INTRAVENOUS at 16:17

## 2018-01-24 RX ADMIN — LIDOCAINE HYDROCHLORIDE 40 ML: 10 INJECTION, SOLUTION INFILTRATION; PERINEURAL at 13:35

## 2018-01-24 RX ADMIN — IOPAMIDOL 30 ML: 612 INJECTION, SOLUTION INTRATHECAL at 13:00

## 2018-01-24 RX ADMIN — OXYCODONE HYDROCHLORIDE 5 MG: 5 TABLET ORAL at 17:54

## 2018-01-24 RX ADMIN — KETOROLAC TROMETHAMINE 30 MG: 30 INJECTION, SOLUTION INTRAMUSCULAR at 13:21

## 2018-01-24 RX ADMIN — SODIUM CHLORIDE 1000 ML: 900 IRRIGANT IRRIGATION at 12:36

## 2018-01-24 RX ADMIN — SODIUM CHLORIDE, POTASSIUM CHLORIDE, SODIUM LACTATE AND CALCIUM CHLORIDE: 600; 310; 30; 20 INJECTION, SOLUTION INTRAVENOUS at 12:08

## 2018-01-24 RX ADMIN — CEFAZOLIN SODIUM 2 G: 2 SOLUTION INTRAVENOUS at 12:30

## 2018-01-24 RX ADMIN — FENTANYL CITRATE 50 MCG: 50 INJECTION, SOLUTION INTRAMUSCULAR; INTRAVENOUS at 14:22

## 2018-01-24 RX ADMIN — ONDANSETRON 4 MG: 2 INJECTION INTRAMUSCULAR; INTRAVENOUS at 14:00

## 2018-01-24 RX ADMIN — NEOSTIGMINE METHYLSULFATE 3 MG: 1 INJECTION INTRAMUSCULAR; INTRAVENOUS; SUBCUTANEOUS at 13:33

## 2018-01-24 RX ADMIN — FENTANYL CITRATE 50 MCG: 50 INJECTION, SOLUTION INTRAMUSCULAR; INTRAVENOUS at 14:17

## 2018-01-24 RX ADMIN — DEXMEDETOMIDINE HYDROCHLORIDE 8 MCG: 100 INJECTION, SOLUTION INTRAVENOUS at 12:44

## 2018-01-24 RX ADMIN — Medication 0.4 MG: at 13:38

## 2018-01-24 RX ADMIN — PROPOFOL: 10 INJECTION, EMULSION INTRAVENOUS at 13:03

## 2018-01-24 RX ADMIN — DEXAMETHASONE SODIUM PHOSPHATE 4 MG: 4 INJECTION, SOLUTION INTRA-ARTICULAR; INTRALESIONAL; INTRAMUSCULAR; INTRAVENOUS; SOFT TISSUE at 12:34

## 2018-01-24 RX ADMIN — DIPHENHYDRAMINE HYDROCHLORIDE 12.5 MG: 50 INJECTION, SOLUTION INTRAMUSCULAR; INTRAVENOUS at 14:53

## 2018-01-24 RX ADMIN — GLYCOPYRROLATE 0.4 MG: 0.2 INJECTION, SOLUTION INTRAMUSCULAR; INTRAVENOUS at 13:32

## 2018-01-24 RX ADMIN — FENTANYL CITRATE 50 MCG: 50 INJECTION, SOLUTION INTRAMUSCULAR; INTRAVENOUS at 12:20

## 2018-01-24 RX ADMIN — LIDOCAINE HYDROCHLORIDE 100 MG: 20 INJECTION, SOLUTION INFILTRATION; PERINEURAL at 12:20

## 2018-01-24 RX ADMIN — FENTANYL CITRATE 50 MCG: 50 INJECTION, SOLUTION INTRAMUSCULAR; INTRAVENOUS at 13:26

## 2018-01-24 RX ADMIN — ROCURONIUM BROMIDE 10 MG: 10 INJECTION INTRAVENOUS at 12:40

## 2018-01-24 RX ADMIN — PROPOFOL 150 MG: 10 INJECTION, EMULSION INTRAVENOUS at 12:20

## 2018-01-24 RX ADMIN — FENTANYL CITRATE 50 MCG: 50 INJECTION, SOLUTION INTRAMUSCULAR; INTRAVENOUS at 12:40

## 2018-01-24 RX ADMIN — ROCURONIUM BROMIDE 40 MG: 10 INJECTION INTRAVENOUS at 12:20

## 2018-01-24 RX ADMIN — SODIUM CHLORIDE, POTASSIUM CHLORIDE, SODIUM LACTATE AND CALCIUM CHLORIDE: 600; 310; 30; 20 INJECTION, SOLUTION INTRAVENOUS at 12:16

## 2018-01-24 RX ADMIN — GLYCOPYRROLATE 0.2 MG: 0.2 INJECTION, SOLUTION INTRAMUSCULAR; INTRAVENOUS at 13:39

## 2018-01-24 ASSESSMENT — LIFESTYLE VARIABLES: TOBACCO_USE: 0

## 2018-01-24 ASSESSMENT — ENCOUNTER SYMPTOMS: SEIZURES: 0

## 2018-01-24 NOTE — ANESTHESIA POSTPROCEDURE EVALUATION
Patient: Chelle Rocha    Procedure(s):  LAPAROSCOPIC CHOLECYSTECTOMY WITH CHOLANGIOGRAMS - Wound Class: II-Clean Contaminated    Diagnosis:Unknown  Diagnosis Additional Information: No value filed.    Anesthesia Type:  General, ETT    Note:  Anesthesia Post Evaluation    Patient location during evaluation: PACU  Patient participation: Able to fully participate in evaluation  Level of consciousness: responsive to verbal stimuli  Pain management: adequate  Airway patency: patent  Cardiovascular status: acceptable  Hydration status: acceptable  PONV: controlled     Anesthetic complications: None          Last vitals:  Vitals:    01/24/18 1530 01/24/18 1540 01/24/18 1619   BP: 140/90 155/90 144/90   Pulse:      Resp: 8 9 16   Temp: 36.2  C (97.2  F)  36.4  C (97.5  F)   SpO2: 99% 98% 94%         Electronically Signed By: Ramón Granados MD  January 24, 2018  5:06 PM

## 2018-01-24 NOTE — ANESTHESIA CARE TRANSFER NOTE
Patient: Chelle Rocha    Procedure(s):  LAPAROSCOPIC CHOLECYSTECTOMY WITH CHOLANGIOGRAMS - Wound Class: II-Clean Contaminated    Diagnosis: Unknown  Diagnosis Additional Information: No value filed.    Anesthesia Type:   General, ETT     Note:  Airway :Face Mask  Patient transferred to:PACU  Comments: Pt to PACU. VSS. Report complete to RNHandoff Report: Identifed the Patient, Identified the Reponsible Provider, Reviewed the pertinent medical history, Discussed the surgical course, Reviewed Intra-OP anesthesia mangement and issues during anesthesia, Set expectations for post-procedure period and Allowed opportunity for questions and acknowledgement of understanding      Vitals: (Last set prior to Anesthesia Care Transfer)    CRNA VITALS  1/24/2018 1317 - 1/24/2018 1350      1/24/2018             Resp Rate (observed): (!)  1                Electronically Signed By: Ying Griffith CRNA, APRN CRNA  January 24, 2018  1:50 PM

## 2018-01-24 NOTE — PROGRESS NOTES
HcG test performed at [address] [place] [address] around 7am -- negative results visualized. No Hcg test necessary at [facility] pre-operatively.

## 2018-01-24 NOTE — OP NOTE
General Surgery Operative Note    PREOPERATIVE DIAGNOSIS:  cholecystitis.    POSTOPERATIVE DIAGNOSIS:  cholecystitis.    PROCEDURE:  Laparoscopic Cholecystectomy    SURGEON:  Nayla Jennings MD    ASSISTANT:  Olya Jones PA-C  The physician s assistant was medically necessary for their expertise in camera management, suctioning and retraction.      ANESTHESIA:  General.    BLOOD LOSS: 5ml    FINDINGS: cholangiogram negative for CBD stone, brisk filling of duodenum and left and right hepatic ducts. Gallbladder wall edema consistent with cholecystitis    INDICATIONS:   Ms. Rocha presented with cholecystitis. Patient is presenting for laparoscopic cholecystectomy with cholangiograms given mild elevation of her alkaline phosphatase, AST and ALT. I explained the risks, benefits, complications including but not limited to bleeding, infection, possible need to open, possible postop hematoma, seroma, bowel, bladder or bile duct injury, MI, PE, and if any of these occurred the patient would require additional procedures. The patient agreed and did sign consent.    DETAILS OF PROCEDURE:   A small skin incision was made in the left upper quadrant. A 5mm 0degree laparoscope was used with a visiport to obtain access to the abdomen. Insufflation was connected and flowed freely. Insufflation pressure was sent to 15mmhg. The abdomen was surveyed and no acute findings were seen. There was no injury from abdominal entrance noted. A 12mm port was placed in the midline at the umbilicus. A 10mm laparoscope was inserted and revealed no trocar injuries. Local was injected to the upper abdomen and two 5s were placed in the right upper quadrant under direct visualization. The gallbladder was retracted superiorly and laterally. The gallbladder was mildly inflamed. There were omental adhesions present to the undersurface of the gallbladder which were taken down with cautery. Cautery was then used to take down the peritoneal  attachments. I was able to delineate the artery which was posterior and branched into a right and left cystic artery and duct and got under the undersurface of the gallbladder to help declinate the duct and artery. This was taken up high to confirm the critical view on multiple views.     A clip was placed distally on the cystic duct and a ductotomy created with the laparoscopic scissors. An angiocatheter was placed in the upper right abdomen and a cholangiogram catheter fed through the angiocath. There was obstruction and the catheter would not easily pass into the duct. The duct was milked from proximal to distal with a maryland clamp and a small stone evacuated.  The catheter was then placed in the ductotomy easily and secured with a clip. With irrigation of sterile saline there was easy flow and no leaking. Fluoroscopy was brought onto the field and contrast injected into the catheter system. We were able to clearly see contrast in the common bile duct and entering the duodenum without any filling defects noted. The right and left hepatic ducts also filled with contrast. The cholangiogram catheter was removed and two clips placed proximally on the cystic duct. The duct was completely transected with laparoscopic scissors. The artery was clipped proximally with two clips and distally with one clip and transected.     The gallbladder was taken off the liver bed with cautery. There was no bile spillage or significant bleeding. The gallbladder was then placed in an Endocatch bag and removed through the umbilical trocar site. The camera was reinserted which showed no bleeding or bile spillage. Copious irrigation were used until clear. The wound bed was inspected multiple times showing that it was completely dry and that the clips were in place. The omentum was packed into the perihepatic fossa. The 12mm port fascia closed with 0 Vicryl in figure-of-eight fashion using the dunia madeleine device. All gas was expelled  and the trocars were taken out under direct visualization. There was some oozing from the right lateral port sites. This was controlled with cautery. Marcaine 0.5% were injected to all the wounds. The skin was closed with a 4-0 Monocryl in a subcuticular fashion. Steri-strips and sterile dressings were applied. The patient tolerated the procedure well. There were no complications. They were awoken from anesthesia and transferred to PACU in stable condition. All sponge, instrument and needle counts were correct.       TRICE HEDRICK MD    Please route or send letter to:  Primary Care Provider (PCP) and Referring Provider

## 2018-01-24 NOTE — PROGRESS NOTES
Admission medication history interview status for the 1/24/2018  admission is complete. See EPIC admission navigator for prior to admission medications     Medication history source reliability:Good    Medication history interview source(s):Patient    Medication history resources (including written lists, pill bottles, clinic record):None    Primary pharmacy[facility]    Additional medication history information not noted on PTA med list :None    Time spent in this activity: 40 minutes    Prior to Admission medications    Medication Sig Last Dose Taking? Auth Provider   OXYCODONE HCL PO Take 5 mg by mouth daily as needed 1/24/2018 at 0515 Yes Reported, Patient   alum & mag hydroxide-simethicone (MYLANTA/MAALOX) 200-200-20 MG/5ML SUSP suspension Take 10-15 mLs by mouth every 4 hours as needed for indigestion 1/24/2018 at 0400 Yes Reported, Patient
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
## 2018-01-24 NOTE — IP AVS SNAPSHOT
[facility] Observation Unit    [address] [place] [id number]    Phone:  [phone]                                       After Visit Summary   1/24/2018    Chelle Rocha    MRN: [id number]           After Visit Summary Signature Page     I have received my discharge instructions, and my questions have been answered. I have discussed any challenges I see with this plan with the nurse or doctor.    ..........................................................................................................................................  Patient/Patient Representative Signature      ..........................................................................................................................................  Patient Representative Print Name and Relationship to Patient    ..................................................               ................................................  Date                                            Time    ..........................................................................................................................................  Reviewed by Signature/Title    ...................................................              ..............................................  Date                                                            Time

## 2018-01-24 NOTE — BRIEF OP NOTE
[facility] Brief Operative Note    Pre-operative diagnosis: Cholelithiasis      Post-operative diagnosis Cholelithiasis      Procedure: Procedure(s):  LAPAROSCOPIC CHOLECYSTECTOMY WITH CHOLANGIOGRAMS - Wound Class: II-Clean Contaminated   Surgeon(s): Surgeon(s) and Role:     * Nayla Jennings MD - Primary     * Olya Jones PA-C - Assisting   Estimated blood loss: 5 mL    Specimens:   ID Type Source Tests Collected by Time Destination   A : GALLBLADDER AND CONTENTS Tissue Gallbladder and Contents SURGICAL PATHOLOGY EXAM Nayla Jennings MD 1/24/2018  1:16 PM       Findings: Same as above      Olya Jones PA-C

## 2018-01-24 NOTE — OR NURSING
Telephone report given to [facility] RN.  Patient will be transported to Room. 17 via cart accompanied by NA.  Belongings: 2 hospital bags. Family : shiraze at home

## 2018-01-24 NOTE — IP AVS SNAPSHOT
MRN:[id number]                      After Visit Summary   1/24/2018    Chelle Rocha    MRN: [id number]           Thank you!     Thank you for choosing [facility] for your care. Our goal is always to provide you with excellent care. Hearing back from our patients is one way we can continue to improve our services. Please take a few minutes to complete the written survey that you may receive in the mail after you visit with us. Thank you!        Patient Information     Date Of Birth          1985        Designated Caregiver       Most Recent Value    Caregiver    Will someone help with your care after discharge? yes    Name of designated caregiver Orlando Tee    Phone number of caregiver [phone]      About your hospital stay     You were admitted on:  January 24, 2018 You last received care in the:  [facility] Observation Unit    You were discharged on:  January 24, 2018       Who to Call     For medical emergencies, please call 911.  For non-urgent questions about your medical care, please call your primary care provider or clinic, [phone]  For questions related to your surgery, please call your surgery clinic        Attending Provider     Provider Specialty    Nayla Jennings MD Surgery       Primary Care Provider Office Phone # Fax #    Tu Amanda [phone]      After Care Instructions     Discharge Instructions       No follow up is needed. Our office will contact you within 2 weeks to check on your progress and answer any questions you may have.  If you have concerns before then or would like to see a provider, please call our office at [phone]                  Your next 10 appointments already scheduled     Mar 07, 2018  1:00 PM CST   Return Visit with Wyatt Golden MD   [facility] [place] [facility]    [address], [address]  [place] [id number] [phone]              Further instructions  from your care team         [facility]  Discharge Instructions for Laparoscopic Cholecystectomy    ACTIVITY    You may climb stairs. Increase your activity gradually.  Avoid strenuous physical activity or heavy lifting greater than 20 lbs. for 2 weeks.    You may drive without restrictions when not using prescription pain medication.    You may return to work/school when you are comfortable without any prescription pain medication    WOUND CARE    You may remove your bandage and shower 24 hours after the surgery.  Pat your incisions dry and leave open to air.  Re-apply dressing (Band-aid or gauze/tape) as needed for drainage.    You may have steri-strips (looks like tape) on your incision. You may remove the steri -strips after 1 week.     Do not soak your incisions in a tub or pool for 2 weeks.     DIET    Return to the diet you were on before surgery.    PAIN    Expect some tenderness and discomfort at the incision sites.  Use the prescribed pain medication at your discretion.  Expect gradual resolution of your pain over several days. You may apply ice to your incisions in 20 minute intervals as needed for the next 48 hours.  After that time, consider switching to heat if you prefer.    You may take Ibuprofen (Motrin, Advil, or Aleve) with food instead of or in addition to your prescribed pain medication.  Do not take Ibuprofen if you have been told not to.    Do not take any additional acetaminophen/APAP/Tylenol with your prescribed pain medication.    Do not drink alcohol or drive while you are taking pain medications.    RETURN APPOINTMENT    No follow up is needed. Our office will contact you within 2 weeks to check on your progress and answer any questions you may have.  If you have concerns before then or would like to see a provider, please call our office at [phone]      CALL YOUR PHYSICIAN IF YOU HAVE    Chills or fever above 101   F.    Drainage from the incisions    Significant  bleeding    Pain not relieved by your pain medication or rest.    Increasing pain after the first 48 hours    HELPFUL HINTS    It is not uncommon to experience some loose stools or diarrhea after surgery.  This is your body s way of adapting to the bile which will slowly drain into your intestine. A low fat diet may help with this.     You may experience shoulder pain which is due to the air injected during the procedure.  This is temporary and usually passes in a day.      Same Day Surgery Discharge Instructions for  Sedation and General Anesthesia       It's not unusual to feel dizzy, light-headed or faint for up to 24 hours after surgery or while taking pain medication.  If you have these symptoms: sit for a few minutes before standing and have someone assist you when you get up to walk or use the bathroom.      You should rest and relax for the next 24 hours. We recommend you make arrangements to have an adult stay with you for at least 24 hours after your discharge.  Avoid hazardous and strenuous activity.      DO NOT DRIVE any vehicle or operate mechanical equipment for 24 hours following the end of your surgery.  Even though you may feel normal, your reactions may be affected by the medication you have received.      Do not drink alcoholic beverages for 24 hours following surgery.       Slowly progress to your regular diet as you feel able. It's not unusual to feel nauseated and/or vomit after receiving anesthesia.  If you develop these symptoms, drink clear liquids (apple juice, ginger ale, broth, 7-up, etc. ) until you feel better.  If your nausea and vomiting persists for 24 hours, please notify your surgeon.        All narcotic pain medications, along with inactivity and anesthesia, can cause constipation. Drinking plenty of liquids and increasing fiber intake will help.      For any questions of a medical nature, call your surgeon.      Do not make important decisions for 24 hours.      If you had  "general anesthesia, you may have a sore throat for a couple of days related to the breathing tube used during surgery.  You may use Cepacol lozenges to help with this discomfort.  If it worsens or if you develop a fever, contact your surgeon.       If you feel your pain is not well managed with the pain medications prescribed by your surgeon, please contact your surgeon's office to let them know so they can address your concerns.       While you were at the hospital today you received Toradol, an antiinflammatory medication similar to Ibuprofen.  You should not take other antiinflammatory medication, such as Ibuprofen, Motrin, Advil, Aleve, Naprosyn, etc, until 7:20 PM 1/24/18.     **If you have concerns or questions about your procedure,    please contact Dr Jennings at  [phone]**            Pending Results     Date and Time Order Name Status Description    1/24/2018 1318 XR Surgery ROZINA L/T 5 Min Fluoro w Stills Preliminary     1/24/2018 1316 Surgical pathology exam In process             Admission Information     Date & Time Provider Department Dept. Phone    1/24/2018 Nayla Jennings MD [facility] Observation Unit [phone]      Your Vitals Were     Blood Pressure Pulse Temperature Respirations Height Weight    137/90 57 97.5  F (36.4  C) (Oral) 16 1.549 m (5' 1\") 67.6 kg (149 lb)    Pulse Oximetry BMI (Body Mass Index)                93% 28.15 kg/m2          [employer] Information     [employer] lets you send messages to your doctor, view your test results, renew your prescriptions, schedule appointments and more. To sign up, go to [email or web address] . Click on \"Log in\" on the left side of the screen, which will take you to the Welcome page. Then click on \"Sign up Now\" on the right side of the page.     You will be asked to enter the access code listed below, as well as some personal information. Please follow the directions to create your username and password.     Your access code is: " [email or web address]  Expires: 3/10/2018  1:34 PM     Your access code will  in 90 days. If you need help or a new code, please call your [facility] clinic or [phone].        Care EveryWhere ID     This is your Care EveryWhere ID. This could be used by other organizations to access your [facility] medical records  [id number]        Equal Access to Services     [facility] : Hadii aad ku hadasho Soomaali, waaxda luqadaha, qaybta kaalmada adeegyada, waxay ciroin hayaan adedara brunner laRaymondadrian . So [facility] [phone].    ATENCIÓN: Si habla blaine, tiene a avitia disposición servicios gratuitos de asistencia lingüística. Wilber al [phone].    We comply with applicable federal civil rights laws and [place] laws. We do not discriminate on the basis of race, color, national origin, age, disability, sex, sexual orientation, or gender identity.               Review of your medicines      CONTINUE these medicines which may have CHANGED, or have new prescriptions. If we are uncertain of the size of tablets/capsules you have at home, strength may be listed as something that might have changed.        Dose / Directions    * OXYCODONE HCL PO   This may have changed:  Another medication with the same name was added. Make sure you understand how and when to take each.        Dose:  5 mg   Take 5 mg by mouth daily as needed   Refills:  0       * oxyCODONE IR 5 MG tablet   Commonly known as:  ROXICODONE   This may have changed:  You were already taking a medication with the same name, and this prescription was added. Make sure you understand how and when to take each.   Used for:  Calculus of gallbladder without cholecystitis without obstruction        Dose:  5-10 mg   Take 1-2 tablets (5-10 mg) by mouth every 3 hours as needed for pain or other (Moderate to Severe)   Quantity:  20 tablet   Refills:  0       * Notice:  This list has 2 medication(s) that are the same as other medications prescribed for you. Read the directions  carefully, and ask your doctor or other care provider to review them with you.      CONTINUE these medicines which have NOT CHANGED        Dose / Directions    alum & mag hydroxide-simethicone 200-200-20 MG/5ML Susp suspension   Commonly known as:  MYLANTA/MAALOX        Dose:  10-15 mL   Take 10-15 mLs by mouth every 4 hours as needed for indigestion   Refills:  0            Where to get your medicines      Some of these will need a paper prescription and others can be bought over the counter. Ask your nurse if you have questions.     Bring a paper prescription for each of these medications     oxyCODONE IR 5 MG tablet                Protect others around you: Learn how to safely use, store and throw away your medicines at www.disposemymeds.org.        Information about OPIOIDS     PRESCRIPTION OPIOIDS: WHAT YOU NEED TO KNOW    Prescription opioids can be used to help relieve moderate to severe pain and are often prescribed following a surgery or injury, or for certain health conditions. These medications can be an important part of treatment but also come with serious risks. It is important to work with your health care provider to make sure you are getting the safest, most effective care.    WHAT ARE THE RISKS AND SIDE EFFECTS OF OPIOID USE?  Prescription opioids carry serious risks of addiction and overdose, especially with prolonged use. An opioid overdose, often marked by slowed breathing can cause sudden death. The use of prescription opioids can have a number of side effects as well, even when taken as directed:      Tolerance - meaning you might need to take more of a medication for the same pain relief    Physical dependence - meaning you have symptoms of withdrawal when a medication is stopped    Increased sensitivity to pain    Constipation    Nausea, vomiting, and dry mouth    Sleepiness and dizziness    Confusion    Depression    Low levels of testosterone that can result in lower sex drive, energy, and  strength    Itching and sweating    RISKS ARE GREATER WITH:    History of drug misuse, substance use disorder, or overdose    Mental health conditions (such as depression or anxiety)    Sleep apnea    Older age (65 years or older)    Pregnancy    Avoid alcohol while taking prescription opioids.   Also, unless specifically advised by your health care provider, medications to avoid include:    Benzodiazepines (such as Xanax or Valium)    Muscle relaxants (such as Soma or Flexeril)    Hypnotics (such as Ambien or Lunesta)    Other prescription opioids    KNOW YOUR OPTIONS:  Talk to your health care provider about ways to manage your pain that do not involve prescription opioids. Some of these options may actually work better and have fewer risks and side effects:    Pain relievers such as acetaminophen, ibuprofen, and naproxen    Some medications that are also used for depression or seizures    Physical therapy and exercise    Cognitive behavioral therapy, a psychological, goal-directed approach, in which patients learn how to modify physical, behavioral, and emotional triggers of pain and stress    IF YOU ARE PRESCRIBED OPIOIDS FOR PAIN:    Never take opioids in greater amounts or more often than prescribed    Follow up with your primary health care provider and work together to create a plan on how to manage your pain.    Talk about ways to help manage your pain that do not involve prescription opioids    Talk about all concerns and side effects    Help prevent misuse and abuse    Never sell or share prescription opioids    Never use another person's prescription opioids    Store prescription opioids in a secure place and out of reach of others (this may include visitors, children, friends, and family)    Visit www.cdc.gov/drugoverdose to learn about risks of opioid abuse and overdose    If you believe you may be struggling with addiction, tell your health care provider and ask for guidance or call [facility] National  Helpline at 2-406-916-HELP    LEARN MORE / www.cdc.gov/drugoverdose/prescribing/guideline.html    Safely dispose of unused prescription opioids: Find your local drug take-back programs and more information about the importance of safe disposal at www.doseofreality.[place].gov             Medication List: This is a list of all your medications and when to take them. Check marks below indicate your daily home schedule. Keep this list as a reference.      Medications           Morning Afternoon Evening Bedtime As Needed    alum & mag hydroxide-simethicone 200-200-20 MG/5ML Susp suspension   Commonly known as:  MYLANTA/MAALOX   Take 10-15 mLs by mouth every 4 hours as needed for indigestion   Next Dose Due:  Resume taking as you do at home                                     * OXYCODONE HCL PO   Take 5 mg by mouth daily as needed   Last time this was given:  5 mg on 1/24/2018  5:54 PM   Next Dose Due:  As needed                                   * oxyCODONE IR 5 MG tablet   Commonly known as:  ROXICODONE   Take 1-2 tablets (5-10 mg) by mouth every 3 hours as needed for pain or other (Moderate to Severe)   Last time this was given:  5 mg on 1/24/2018  5:54 PM   Next Dose Due:  As needed                                   * Notice:  This list has 2 medication(s) that are the same as other medications prescribed for you. Read the directions carefully, and ask your doctor or other care provider to review them with you.

## 2018-01-24 NOTE — OR NURSING
PA informed that patient is very sleepy and unable to wean off Oxygen.  Patient will have extended stay in Observation Unit.

## 2018-01-25 LAB — COPATH REPORT: NORMAL

## 2018-01-25 NOTE — DISCHARGE INSTRUCTIONS
[facility]  Discharge Instructions for Laparoscopic Cholecystectomy    ACTIVITY    You may climb stairs. Increase your activity gradually.  Avoid strenuous physical activity or heavy lifting greater than 20 lbs. for 2 weeks.    You may drive without restrictions when not using prescription pain medication.    You may return to work/school when you are comfortable without any prescription pain medication    WOUND CARE    You may remove your bandage and shower 24 hours after the surgery.  Pat your incisions dry and leave open to air.  Re-apply dressing (Band-aid or gauze/tape) as needed for drainage.    You may have steri-strips (looks like tape) on your incision. You may remove the steri -strips after 1 week.     Do not soak your incisions in a tub or pool for 2 weeks.     DIET    Return to the diet you were on before surgery.    PAIN    Expect some tenderness and discomfort at the incision sites.  Use the prescribed pain medication at your discretion.  Expect gradual resolution of your pain over several days. You may apply ice to your incisions in 20 minute intervals as needed for the next 48 hours.  After that time, consider switching to heat if you prefer.    You may take Ibuprofen (Motrin, Advil, or Aleve) with food instead of or in addition to your prescribed pain medication.  Do not take Ibuprofen if you have been told not to.    Do not take any additional acetaminophen/APAP/Tylenol with your prescribed pain medication.    Do not drink alcohol or drive while you are taking pain medications.    RETURN APPOINTMENT    No follow up is needed. Our office will contact you within 2 weeks to check on your progress and answer any questions you may have.  If you have concerns before then or would like to see a provider, please call our office at [phone]      CALL YOUR PHYSICIAN IF YOU HAVE    Chills or fever above 101   F.    Drainage from the incisions    Significant bleeding    Pain not relieved by  your pain medication or rest.    Increasing pain after the first 48 hours    HELPFUL HINTS    It is not uncommon to experience some loose stools or diarrhea after surgery.  This is your body s way of adapting to the bile which will slowly drain into your intestine. A low fat diet may help with this.     You may experience shoulder pain which is due to the air injected during the procedure.  This is temporary and usually passes in a day.      Same Day Surgery Discharge Instructions for  Sedation and General Anesthesia       It's not unusual to feel dizzy, light-headed or faint for up to 24 hours after surgery or while taking pain medication.  If you have these symptoms: sit for a few minutes before standing and have someone assist you when you get up to walk or use the bathroom.      You should rest and relax for the next 24 hours. We recommend you make arrangements to have an adult stay with you for at least 24 hours after your discharge.  Avoid hazardous and strenuous activity.      DO NOT DRIVE any vehicle or operate mechanical equipment for 24 hours following the end of your surgery.  Even though you may feel normal, your reactions may be affected by the medication you have received.      Do not drink alcoholic beverages for 24 hours following surgery.       Slowly progress to your regular diet as you feel able. It's not unusual to feel nauseated and/or vomit after receiving anesthesia.  If you develop these symptoms, drink clear liquids (apple juice, ginger ale, broth, 7-up, etc. ) until you feel better.  If your nausea and vomiting persists for 24 hours, please notify your surgeon.        All narcotic pain medications, along with inactivity and anesthesia, can cause constipation. Drinking plenty of liquids and increasing fiber intake will help.      For any questions of a medical nature, call your surgeon.      Do not make important decisions for 24 hours.      If you had general anesthesia, you may have a sore  throat for a couple of days related to the breathing tube used during surgery.  You may use Cepacol lozenges to help with this discomfort.  If it worsens or if you develop a fever, contact your surgeon.       If you feel your pain is not well managed with the pain medications prescribed by your surgeon, please contact your surgeon's office to let them know so they can address your concerns.       While you were at the hospital today you received Toradol, an antiinflammatory medication similar to Ibuprofen.  You should not take other antiinflammatory medication, such as Ibuprofen, Motrin, Advil, Aleve, Naprosyn, etc, until 7:20 PM 1/24/18.     **If you have concerns or questions about your procedure,    please contact Dr Jennings at  [phone]**

## 2018-01-25 NOTE — PROVIDER NOTIFICATION
MD Notification    Notified Person:  MD    Notified Persons Name: Isak DUARTE     Notification Date/Time: 1/24/2018. 2030    Notification Interaction:  Talked with Physician    Purpose of Notification: Pt meet d/c criteria. Need d/c orders.    Orders Received: Yes    Comments:

## 2018-01-25 NOTE — PLAN OF CARE
Problem: Patient Care Overview  Goal: Plan of Care/Patient Progress Review  Outcome: Adequate for Discharge Date Met: 01/24/18  VS stable, up ad tamy, ambulate, minimal pain, taking po well. Voiding adequately. Pt met criteria to be discharged and pt agreed with the plan.  Mother is her to take pt home. Discharge instruction given to pt and pt verbalized her understanding regarding to discharge instruction.

## 2018-01-29 ENCOUNTER — TELEPHONE (OUTPATIENT)
Dept: SURGERY | Facility: CLINIC | Age: 33
End: 2018-01-29

## 2018-01-29 ENCOUNTER — TELEPHONE (OUTPATIENT)
Dept: OTHER | Facility: CLINIC | Age: 33
End: 2018-01-29

## 2018-01-29 NOTE — TELEPHONE ENCOUNTER
Name of caller: Patient    Reason for Call:  Pain     Surgeon:  Dr. Jennings    Recent Surgery:  Yes.    If yes, when & what type:  Laparoscopic Cholecystectomy 1/24      Best phone number to reach pt at is: [phone]  Ok to leave a message with medical info? Yes.    Pharmacy preferred (if calling for a refill): [facility] - [address] [phone]

## 2018-01-30 NOTE — TELEPHONE ENCOUNTER
Patient Name: Chelle Rocha  Today's Date: 01/29/18    Spoke with patient. She underwent lap kevin with cholangiograms last Wed with Dr. Jennings. Hospital notes and Op note reviewed.   Pt states she is having continued pain especially around her umbilical incision, lower abdomen and on the left side. Oxycodone not controlling it well and she is almost out so trying to limit what she takes. She hasn't been taking ibuprofen or tylenol. She is eating and drinking normally, avoiding greasy foods as they are causing diarrhea. Having regular bowel movements. Denies nausea, vomiting, fever, chills, redness of skin around incisions, drainage from incisions, yellow skin or eyes, dysuria, light-colored stools or abnormally dark urine. States she hasn't been drinking much water. Also mentions a slight rash that developed over her anterior abdomen and upper thighs in the distribution of the orange chloraprep used for surgery.     Recommendations made: benadryl for rash, showers with soap to remove any residual chloraprep. Add tylenol and ibuprofen tonight for pain. Walk frequently but otherwise minimize activity. Avoid large meals or greasy food.   She also has some tramadol from recent pregnancy, advised to take 50-100mg tramadol every 6 hours as needed tonight for pain once she is out of oxycodone. Our office is currently closed so I cannot provide her with a refill.  She will call our office tomorrow morning and update us on her progress. We will refill her oxycodone tomorrow if needed. Advised that if she has worsening or persistent pain that does not improve with medications, nausea, vomiting, fevers or chills she should seek care in the ER. Patient voiced understanding of our discussion and will call our office again tomorrow.      Safia Prajapati PA-C  [facility]  [phone]

## 2018-02-03 ENCOUNTER — HEALTH MAINTENANCE LETTER (OUTPATIENT)
Age: 33
End: 2018-02-03

## 2018-02-05 ENCOUNTER — TELEPHONE (OUTPATIENT)
Dept: SURGERY | Facility: CLINIC | Age: 33
End: 2018-02-05

## 2018-02-05 NOTE — TELEPHONE ENCOUNTER
[facility]  Post op call note - Laparoscopic Cholecystectomy    Chelle Rocha was called for an update regarding her recovery.  She underwent a laparoscopic cholecystectomy by Dr. Jennings on 1/24/2018.  She states she continues to have lower left abdominal pain near her umbilicus.  She had called Friday to get an appointment but none of the available times for Dr Jennings worked for her.  She also states she feels she needs more pain medication for her pain.  She would like to schedule appointment for follow up.  Will have her see MILADY Lee tomorrow at 2 pm      Olya Jones PA-C

## 2018-04-17 ENCOUNTER — TRANSFERRED RECORDS (OUTPATIENT)
Dept: HEALTH INFORMATION MANAGEMENT | Facility: CLINIC | Age: 33
End: 2018-04-17

## 2018-04-18 RX ORDER — FERROUS SULFATE 325(65) MG
325 TABLET ORAL EVERY MORNING
Status: ON HOLD | COMMUNITY
End: 2018-07-10

## 2018-04-19 ENCOUNTER — ANESTHESIA EVENT (OUTPATIENT)
Dept: SURGERY | Facility: CLINIC | Age: 33
End: 2018-04-19
Payer: COMMERCIAL

## 2018-04-20 ENCOUNTER — HOSPITAL ENCOUNTER (OUTPATIENT)
Facility: CLINIC | Age: 33
Discharge: HOME | End: 2018-04-21
Attending: OBSTETRICS & GYNECOLOGY | Admitting: OBSTETRICS & GYNECOLOGY
Payer: COMMERCIAL

## 2018-04-20 ENCOUNTER — ANESTHESIA (OUTPATIENT)
Dept: SURGERY | Facility: CLINIC | Age: 33
End: 2018-04-20
Payer: COMMERCIAL

## 2018-04-20 DIAGNOSIS — N73.6 PELVIC ADHESIONS: ICD-10-CM

## 2018-04-20 DIAGNOSIS — N39.3 STRESS INCONTINENCE: ICD-10-CM

## 2018-04-20 DIAGNOSIS — N81.2 UTEROVAGINAL PROLAPSE, INCOMPLETE: Primary | ICD-10-CM

## 2018-04-20 DIAGNOSIS — N81.4 CYSTOCELE WITH UTERINE DESCENSUS: ICD-10-CM

## 2018-04-20 LAB
ABO + RH BLD: NORMAL
ABO + RH BLD: NORMAL
B-HCG SERPL-ACNC: <1 IU/L (ref 0–5)
BLD GP AB SCN SERPL QL: NORMAL
BLOOD BANK CMNT PATIENT-IMP: NORMAL
HGB BLD-MCNC: 11.9 G/DL (ref 11.7–15.7)
SPECIMEN EXP DATE BLD: NORMAL

## 2018-04-20 PROCEDURE — 25000128 H RX IP 250 OP 636: Performed by: OBSTETRICS & GYNECOLOGY

## 2018-04-20 PROCEDURE — 37000009 ZZH ANESTHESIA TECHNICAL FEE, EACH ADDTL 15 MIN: Performed by: OBSTETRICS & GYNECOLOGY

## 2018-04-20 PROCEDURE — 25000125 ZZHC RX 250: Performed by: OBSTETRICS & GYNECOLOGY

## 2018-04-20 PROCEDURE — 88305 TISSUE EXAM BY PATHOLOGIST: CPT | Mod: 26 | Performed by: OBSTETRICS & GYNECOLOGY

## 2018-04-20 PROCEDURE — 85018 HEMOGLOBIN: CPT | Performed by: OBSTETRICS & GYNECOLOGY

## 2018-04-20 PROCEDURE — 25000128 H RX IP 250 OP 636: Performed by: ANESTHESIOLOGY

## 2018-04-20 PROCEDURE — 25000566 ZZH SEVOFLURANE, EA 15 MIN: Performed by: OBSTETRICS & GYNECOLOGY

## 2018-04-20 PROCEDURE — 25800025 ZZH RX 258: Performed by: OBSTETRICS & GYNECOLOGY

## 2018-04-20 PROCEDURE — 36000087 ZZH SURGERY LEVEL 8 EA 15 ADDTL MIN: Performed by: OBSTETRICS & GYNECOLOGY

## 2018-04-20 PROCEDURE — 86901 BLOOD TYPING SEROLOGIC RH(D): CPT | Performed by: OBSTETRICS & GYNECOLOGY

## 2018-04-20 PROCEDURE — 27210995 ZZH RX 272: Performed by: OBSTETRICS & GYNECOLOGY

## 2018-04-20 PROCEDURE — 27210794 ZZH OR GENERAL SUPPLY STERILE: Performed by: OBSTETRICS & GYNECOLOGY

## 2018-04-20 PROCEDURE — 84702 CHORIONIC GONADOTROPIN TEST: CPT | Performed by: OBSTETRICS & GYNECOLOGY

## 2018-04-20 PROCEDURE — 71000013 ZZH RECOVERY PHASE 1 LEVEL 1 EA ADDTL HR: Performed by: OBSTETRICS & GYNECOLOGY

## 2018-04-20 PROCEDURE — C1771 REP DEV, URINARY, W/SLING: HCPCS | Performed by: OBSTETRICS & GYNECOLOGY

## 2018-04-20 PROCEDURE — 36415 COLL VENOUS BLD VENIPUNCTURE: CPT | Performed by: OBSTETRICS & GYNECOLOGY

## 2018-04-20 PROCEDURE — 86850 RBC ANTIBODY SCREEN: CPT | Performed by: OBSTETRICS & GYNECOLOGY

## 2018-04-20 PROCEDURE — 25000132 ZZH RX MED GY IP 250 OP 250 PS 637: Performed by: OBSTETRICS & GYNECOLOGY

## 2018-04-20 PROCEDURE — 88305 TISSUE EXAM BY PATHOLOGIST: CPT | Performed by: OBSTETRICS & GYNECOLOGY

## 2018-04-20 PROCEDURE — 25000125 ZZHC RX 250: Performed by: NURSE ANESTHETIST, CERTIFIED REGISTERED

## 2018-04-20 PROCEDURE — 25000128 H RX IP 250 OP 636: Performed by: NURSE ANESTHETIST, CERTIFIED REGISTERED

## 2018-04-20 PROCEDURE — 37000008 ZZH ANESTHESIA TECHNICAL FEE, 1ST 30 MIN: Performed by: OBSTETRICS & GYNECOLOGY

## 2018-04-20 PROCEDURE — 71000012 ZZH RECOVERY PHASE 1 LEVEL 1 FIRST HR: Performed by: OBSTETRICS & GYNECOLOGY

## 2018-04-20 PROCEDURE — 86900 BLOOD TYPING SEROLOGIC ABO: CPT | Performed by: OBSTETRICS & GYNECOLOGY

## 2018-04-20 PROCEDURE — 40000170 ZZH STATISTIC PRE-PROCEDURE ASSESSMENT II: Performed by: OBSTETRICS & GYNECOLOGY

## 2018-04-20 PROCEDURE — C1781 MESH (IMPLANTABLE): HCPCS | Performed by: OBSTETRICS & GYNECOLOGY

## 2018-04-20 PROCEDURE — 36000085 ZZH SURGERY LEVEL 8 1ST 30 MIN: Performed by: OBSTETRICS & GYNECOLOGY

## 2018-04-20 DEVICE — MESH SLING ADVANTAGE FIT SYSTEM M0068502110: Type: IMPLANTABLE DEVICE | Site: PELVIS | Status: FUNCTIONAL

## 2018-04-20 DEVICE — MESH SLING ARTISYN SACROCOLPOPEXY ARTY5L: Type: IMPLANTABLE DEVICE | Site: PELVIS | Status: FUNCTIONAL

## 2018-04-20 RX ORDER — FENTANYL CITRATE 50 UG/ML
INJECTION, SOLUTION INTRAMUSCULAR; INTRAVENOUS PRN
Status: DISCONTINUED | OUTPATIENT
Start: 2018-04-20 | End: 2018-04-20

## 2018-04-20 RX ORDER — ALUMINA, MAGNESIA, AND SIMETHICONE 2400; 2400; 240 MG/30ML; MG/30ML; MG/30ML
30 SUSPENSION ORAL EVERY 4 HOURS PRN
Status: DISCONTINUED | OUTPATIENT
Start: 2018-04-20 | End: 2018-04-21 | Stop reason: HOSPADM

## 2018-04-20 RX ORDER — HYDROMORPHONE HYDROCHLORIDE 1 MG/ML
.3-.5 INJECTION, SOLUTION INTRAMUSCULAR; INTRAVENOUS; SUBCUTANEOUS EVERY 5 MIN PRN
Status: DISCONTINUED | OUTPATIENT
Start: 2018-04-20 | End: 2018-04-20 | Stop reason: HOSPADM

## 2018-04-20 RX ORDER — PROPRANOLOL HYDROCHLORIDE 20 MG/1
20 TABLET ORAL 2 TIMES DAILY PRN
Status: DISCONTINUED | OUTPATIENT
Start: 2018-04-20 | End: 2018-04-21 | Stop reason: HOSPADM

## 2018-04-20 RX ORDER — KETOROLAC TROMETHAMINE 30 MG/ML
30 INJECTION, SOLUTION INTRAMUSCULAR; INTRAVENOUS EVERY 6 HOURS PRN
Status: DISCONTINUED | OUTPATIENT
Start: 2018-04-20 | End: 2018-04-20

## 2018-04-20 RX ORDER — ONDANSETRON 4 MG/1
4 TABLET, ORALLY DISINTEGRATING ORAL EVERY 30 MIN PRN
Status: DISCONTINUED | OUTPATIENT
Start: 2018-04-20 | End: 2018-04-20 | Stop reason: HOSPADM

## 2018-04-20 RX ORDER — FERROUS SULFATE 325(65) MG
325 TABLET ORAL EVERY MORNING
Status: DISCONTINUED | OUTPATIENT
Start: 2018-04-21 | End: 2018-04-21 | Stop reason: HOSPADM

## 2018-04-20 RX ORDER — OXYCODONE AND ACETAMINOPHEN 5; 325 MG/1; MG/1
1-2 TABLET ORAL EVERY 4 HOURS PRN
Status: DISCONTINUED | OUTPATIENT
Start: 2018-04-20 | End: 2018-04-21 | Stop reason: HOSPADM

## 2018-04-20 RX ORDER — DEXAMETHASONE SODIUM PHOSPHATE 4 MG/ML
INJECTION, SOLUTION INTRA-ARTICULAR; INTRALESIONAL; INTRAMUSCULAR; INTRAVENOUS; SOFT TISSUE PRN
Status: DISCONTINUED | OUTPATIENT
Start: 2018-04-20 | End: 2018-04-20

## 2018-04-20 RX ORDER — HYDROXYZINE HYDROCHLORIDE 25 MG/1
25 TABLET, FILM COATED ORAL DAILY PRN
Status: DISCONTINUED | OUTPATIENT
Start: 2018-04-20 | End: 2018-04-21 | Stop reason: HOSPADM

## 2018-04-20 RX ORDER — FENTANYL CITRATE 50 UG/ML
25-50 INJECTION, SOLUTION INTRAMUSCULAR; INTRAVENOUS
Status: DISCONTINUED | OUTPATIENT
Start: 2018-04-20 | End: 2018-04-20 | Stop reason: HOSPADM

## 2018-04-20 RX ORDER — NALOXONE HYDROCHLORIDE 0.4 MG/ML
.1-.4 INJECTION, SOLUTION INTRAMUSCULAR; INTRAVENOUS; SUBCUTANEOUS
Status: DISCONTINUED | OUTPATIENT
Start: 2018-04-20 | End: 2018-04-21 | Stop reason: HOSPADM

## 2018-04-20 RX ORDER — NEOSTIGMINE METHYLSULFATE 1 MG/ML
VIAL (ML) INJECTION PRN
Status: DISCONTINUED | OUTPATIENT
Start: 2018-04-20 | End: 2018-04-20

## 2018-04-20 RX ORDER — METOCLOPRAMIDE HYDROCHLORIDE 5 MG/ML
10 INJECTION INTRAMUSCULAR; INTRAVENOUS EVERY 6 HOURS PRN
Status: DISCONTINUED | OUTPATIENT
Start: 2018-04-20 | End: 2018-04-21 | Stop reason: HOSPADM

## 2018-04-20 RX ORDER — BUPIVACAINE HYDROCHLORIDE AND EPINEPHRINE 2.5; 5 MG/ML; UG/ML
INJECTION, SOLUTION INFILTRATION; PERINEURAL PRN
Status: DISCONTINUED | OUTPATIENT
Start: 2018-04-20 | End: 2018-04-20

## 2018-04-20 RX ORDER — SODIUM CHLORIDE, SODIUM LACTATE, POTASSIUM CHLORIDE, CALCIUM CHLORIDE 600; 310; 30; 20 MG/100ML; MG/100ML; MG/100ML; MG/100ML
INJECTION, SOLUTION INTRAVENOUS CONTINUOUS
Status: DISCONTINUED | OUTPATIENT
Start: 2018-04-20 | End: 2018-04-20 | Stop reason: HOSPADM

## 2018-04-20 RX ORDER — MEPERIDINE HYDROCHLORIDE 25 MG/ML
12.5 INJECTION INTRAMUSCULAR; INTRAVENOUS; SUBCUTANEOUS EVERY 5 MIN PRN
Status: DISCONTINUED | OUTPATIENT
Start: 2018-04-20 | End: 2018-04-20 | Stop reason: HOSPADM

## 2018-04-20 RX ORDER — PROCHLORPERAZINE MALEATE 5 MG
10 TABLET ORAL EVERY 6 HOURS PRN
Status: DISCONTINUED | OUTPATIENT
Start: 2018-04-20 | End: 2018-04-21 | Stop reason: HOSPADM

## 2018-04-20 RX ORDER — ONDANSETRON 2 MG/ML
4 INJECTION INTRAMUSCULAR; INTRAVENOUS EVERY 30 MIN PRN
Status: DISCONTINUED | OUTPATIENT
Start: 2018-04-20 | End: 2018-04-20 | Stop reason: HOSPADM

## 2018-04-20 RX ORDER — SODIUM CHLORIDE, SODIUM LACTATE, POTASSIUM CHLORIDE, CALCIUM CHLORIDE 600; 310; 30; 20 MG/100ML; MG/100ML; MG/100ML; MG/100ML
INJECTION, SOLUTION INTRAVENOUS CONTINUOUS
Status: DISCONTINUED | OUTPATIENT
Start: 2018-04-20 | End: 2018-04-20

## 2018-04-20 RX ORDER — PROPOFOL 10 MG/ML
INJECTION, EMULSION INTRAVENOUS CONTINUOUS PRN
Status: DISCONTINUED | OUTPATIENT
Start: 2018-04-20 | End: 2018-04-20

## 2018-04-20 RX ORDER — PROPOFOL 10 MG/ML
INJECTION, EMULSION INTRAVENOUS PRN
Status: DISCONTINUED | OUTPATIENT
Start: 2018-04-20 | End: 2018-04-20

## 2018-04-20 RX ORDER — ONDANSETRON 4 MG/1
4 TABLET, ORALLY DISINTEGRATING ORAL EVERY 6 HOURS PRN
Status: DISCONTINUED | OUTPATIENT
Start: 2018-04-20 | End: 2018-04-21 | Stop reason: HOSPADM

## 2018-04-20 RX ORDER — CEFAZOLIN SODIUM 1 G/3ML
1 INJECTION, POWDER, FOR SOLUTION INTRAMUSCULAR; INTRAVENOUS SEE ADMIN INSTRUCTIONS
Status: DISCONTINUED | OUTPATIENT
Start: 2018-04-20 | End: 2018-04-20

## 2018-04-20 RX ORDER — OXYCODONE HYDROCHLORIDE 5 MG/1
5 TABLET ORAL EVERY 4 HOURS PRN
Status: DISCONTINUED | OUTPATIENT
Start: 2018-04-20 | End: 2018-04-21

## 2018-04-20 RX ORDER — OXYCODONE AND ACETAMINOPHEN 5; 325 MG/1; MG/1
1-2 TABLET ORAL EVERY 4 HOURS PRN
Qty: 30 TABLET | Refills: 0 | Status: SHIPPED | OUTPATIENT
Start: 2018-04-20 | End: 2018-04-21

## 2018-04-20 RX ORDER — METOCLOPRAMIDE 10 MG/1
10 TABLET ORAL EVERY 6 HOURS PRN
Status: DISCONTINUED | OUTPATIENT
Start: 2018-04-20 | End: 2018-04-21 | Stop reason: HOSPADM

## 2018-04-20 RX ORDER — GLYCOPYRROLATE 0.2 MG/ML
INJECTION, SOLUTION INTRAMUSCULAR; INTRAVENOUS PRN
Status: DISCONTINUED | OUTPATIENT
Start: 2018-04-20 | End: 2018-04-20

## 2018-04-20 RX ORDER — ALBUTEROL SULFATE 0.83 MG/ML
2.5 SOLUTION RESPIRATORY (INHALATION) EVERY 4 HOURS PRN
Status: DISCONTINUED | OUTPATIENT
Start: 2018-04-20 | End: 2018-04-20 | Stop reason: HOSPADM

## 2018-04-20 RX ORDER — LIDOCAINE HYDROCHLORIDE 20 MG/ML
INJECTION, SOLUTION INFILTRATION; PERINEURAL PRN
Status: DISCONTINUED | OUTPATIENT
Start: 2018-04-20 | End: 2018-04-20

## 2018-04-20 RX ORDER — ONDANSETRON 2 MG/ML
4 INJECTION INTRAMUSCULAR; INTRAVENOUS EVERY 6 HOURS PRN
Status: DISCONTINUED | OUTPATIENT
Start: 2018-04-20 | End: 2018-04-21 | Stop reason: HOSPADM

## 2018-04-20 RX ORDER — CEFAZOLIN SODIUM 2 G/100ML
2 INJECTION, SOLUTION INTRAVENOUS
Status: COMPLETED | OUTPATIENT
Start: 2018-04-20 | End: 2018-04-20

## 2018-04-20 RX ORDER — IBUPROFEN 600 MG/1
600 TABLET, FILM COATED ORAL EVERY 6 HOURS PRN
Status: DISCONTINUED | OUTPATIENT
Start: 2018-04-20 | End: 2018-04-20

## 2018-04-20 RX ORDER — SODIUM CHLORIDE, SODIUM LACTATE, POTASSIUM CHLORIDE, CALCIUM CHLORIDE 600; 310; 30; 20 MG/100ML; MG/100ML; MG/100ML; MG/100ML
INJECTION, SOLUTION INTRAVENOUS CONTINUOUS
Status: DISCONTINUED | OUTPATIENT
Start: 2018-04-20 | End: 2018-04-21 | Stop reason: HOSPADM

## 2018-04-20 RX ORDER — LIDOCAINE 40 MG/G
CREAM TOPICAL
Status: DISCONTINUED | OUTPATIENT
Start: 2018-04-20 | End: 2018-04-21 | Stop reason: HOSPADM

## 2018-04-20 RX ORDER — IBUPROFEN 600 MG/1
600 TABLET, FILM COATED ORAL EVERY 6 HOURS PRN
Status: DISCONTINUED | OUTPATIENT
Start: 2018-04-20 | End: 2018-04-21 | Stop reason: HOSPADM

## 2018-04-20 RX ORDER — KETOROLAC TROMETHAMINE 30 MG/ML
30 INJECTION, SOLUTION INTRAMUSCULAR; INTRAVENOUS EVERY 6 HOURS PRN
Status: DISCONTINUED | OUTPATIENT
Start: 2018-04-20 | End: 2018-04-21 | Stop reason: HOSPADM

## 2018-04-20 RX ORDER — NALOXONE HYDROCHLORIDE 0.4 MG/ML
.1-.4 INJECTION, SOLUTION INTRAMUSCULAR; INTRAVENOUS; SUBCUTANEOUS
Status: DISCONTINUED | OUTPATIENT
Start: 2018-04-20 | End: 2018-04-20

## 2018-04-20 RX ORDER — IBUPROFEN 600 MG/1
600 TABLET, FILM COATED ORAL EVERY 6 HOURS PRN
Qty: 60 TABLET | Refills: 0 | Status: ON HOLD | OUTPATIENT
Start: 2018-04-20 | End: 2018-07-10

## 2018-04-20 RX ORDER — MAGNESIUM HYDROXIDE 1200 MG/15ML
LIQUID ORAL PRN
Status: DISCONTINUED | OUTPATIENT
Start: 2018-04-20 | End: 2018-04-20

## 2018-04-20 RX ORDER — ONDANSETRON 2 MG/ML
INJECTION INTRAMUSCULAR; INTRAVENOUS PRN
Status: DISCONTINUED | OUTPATIENT
Start: 2018-04-20 | End: 2018-04-20

## 2018-04-20 RX ORDER — AMOXICILLIN 250 MG
1 CAPSULE ORAL 2 TIMES DAILY
Status: ON HOLD | COMMUNITY
End: 2018-07-10

## 2018-04-20 RX ORDER — BUPIVACAINE HYDROCHLORIDE AND EPINEPHRINE 5; 5 MG/ML; UG/ML
INJECTION, SOLUTION PERINEURAL PRN
Status: DISCONTINUED | OUTPATIENT
Start: 2018-04-20 | End: 2018-04-20

## 2018-04-20 RX ORDER — HYDROMORPHONE HYDROCHLORIDE 1 MG/ML
0.2 INJECTION, SOLUTION INTRAMUSCULAR; INTRAVENOUS; SUBCUTANEOUS
Status: DISCONTINUED | OUTPATIENT
Start: 2018-04-20 | End: 2018-04-21 | Stop reason: HOSPADM

## 2018-04-20 RX ORDER — AMOXICILLIN 250 MG
1 CAPSULE ORAL 2 TIMES DAILY
Status: DISCONTINUED | OUTPATIENT
Start: 2018-04-20 | End: 2018-04-21 | Stop reason: HOSPADM

## 2018-04-20 RX ORDER — LAMOTRIGINE 100 MG/1
100 TABLET ORAL EVERY MORNING
Status: DISCONTINUED | OUTPATIENT
Start: 2018-04-21 | End: 2018-04-21 | Stop reason: HOSPADM

## 2018-04-20 RX ORDER — QUETIAPINE FUMARATE 25 MG/1
25 TABLET, FILM COATED ORAL EVERY EVENING
Status: DISCONTINUED | OUTPATIENT
Start: 2018-04-20 | End: 2018-04-21 | Stop reason: HOSPADM

## 2018-04-20 RX ADMIN — SENNOSIDES AND DOCUSATE SODIUM 1 TABLET: 8.6; 5 TABLET ORAL at 19:18

## 2018-04-20 RX ADMIN — FENTANYL CITRATE 50 MCG: 50 INJECTION, SOLUTION INTRAMUSCULAR; INTRAVENOUS at 12:43

## 2018-04-20 RX ADMIN — GLYCOPYRROLATE 0.3 MG: 0.2 INJECTION, SOLUTION INTRAMUSCULAR; INTRAVENOUS at 13:04

## 2018-04-20 RX ADMIN — HYDROMORPHONE HYDROCHLORIDE 0.5 MG: 1 INJECTION, SOLUTION INTRAMUSCULAR; INTRAVENOUS; SUBCUTANEOUS at 16:55

## 2018-04-20 RX ADMIN — DEXMEDETOMIDINE HYDROCHLORIDE 8 MCG: 100 INJECTION, SOLUTION INTRAVENOUS at 14:49

## 2018-04-20 RX ADMIN — HYDROMORPHONE HYDROCHLORIDE 0.5 MG: 1 INJECTION, SOLUTION INTRAMUSCULAR; INTRAVENOUS; SUBCUTANEOUS at 14:19

## 2018-04-20 RX ADMIN — HYDROMORPHONE HYDROCHLORIDE 0.5 MG: 1 INJECTION, SOLUTION INTRAMUSCULAR; INTRAVENOUS; SUBCUTANEOUS at 13:33

## 2018-04-20 RX ADMIN — ROCURONIUM BROMIDE 10 MG: 10 INJECTION INTRAVENOUS at 13:05

## 2018-04-20 RX ADMIN — PHENYLEPHRINE HYDROCHLORIDE 100 MCG: 10 INJECTION, SOLUTION INTRAMUSCULAR; INTRAVENOUS; SUBCUTANEOUS at 13:32

## 2018-04-20 RX ADMIN — FENTANYL CITRATE 50 MCG: 50 INJECTION, SOLUTION INTRAMUSCULAR; INTRAVENOUS at 12:57

## 2018-04-20 RX ADMIN — DEXMEDETOMIDINE HYDROCHLORIDE 8 MCG: 100 INJECTION, SOLUTION INTRAVENOUS at 14:56

## 2018-04-20 RX ADMIN — SODIUM CHLORIDE, POTASSIUM CHLORIDE, SODIUM LACTATE AND CALCIUM CHLORIDE: 600; 310; 30; 20 INJECTION, SOLUTION INTRAVENOUS at 13:15

## 2018-04-20 RX ADMIN — CEFAZOLIN SODIUM 1 G: 2 INJECTION, SOLUTION INTRAVENOUS at 14:47

## 2018-04-20 RX ADMIN — HYDROMORPHONE HYDROCHLORIDE 0.5 MG: 1 INJECTION, SOLUTION INTRAMUSCULAR; INTRAVENOUS; SUBCUTANEOUS at 15:45

## 2018-04-20 RX ADMIN — ONDANSETRON 4 MG: 2 INJECTION INTRAMUSCULAR; INTRAVENOUS at 14:10

## 2018-04-20 RX ADMIN — ROCURONIUM BROMIDE 10 MG: 10 INJECTION INTRAVENOUS at 14:03

## 2018-04-20 RX ADMIN — SODIUM CHLORIDE, POTASSIUM CHLORIDE, SODIUM LACTATE AND CALCIUM CHLORIDE: 600; 310; 30; 20 INJECTION, SOLUTION INTRAVENOUS at 12:13

## 2018-04-20 RX ADMIN — PROPOFOL 50 MCG/KG/MIN: 10 INJECTION, EMULSION INTRAVENOUS at 12:43

## 2018-04-20 RX ADMIN — QUETIAPINE FUMARATE 25 MG: 25 TABLET ORAL at 19:18

## 2018-04-20 RX ADMIN — HYDROMORPHONE HYDROCHLORIDE 0.2 MG: 1 INJECTION, SOLUTION INTRAMUSCULAR; INTRAVENOUS; SUBCUTANEOUS at 19:18

## 2018-04-20 RX ADMIN — LIDOCAINE HYDROCHLORIDE 50 MG: 20 INJECTION, SOLUTION INFILTRATION; PERINEURAL at 12:43

## 2018-04-20 RX ADMIN — NEOSTIGMINE METHYLSULFATE 3.5 MG: 1 INJECTION, SOLUTION INTRAVENOUS at 14:30

## 2018-04-20 RX ADMIN — ONDANSETRON 4 MG: 2 INJECTION INTRAMUSCULAR; INTRAVENOUS at 18:18

## 2018-04-20 RX ADMIN — GLYCOPYRROLATE 0.5 MG: 0.2 INJECTION, SOLUTION INTRAMUSCULAR; INTRAVENOUS at 14:30

## 2018-04-20 RX ADMIN — KETOROLAC TROMETHAMINE 30 MG: 30 INJECTION, SOLUTION INTRAMUSCULAR at 20:19

## 2018-04-20 RX ADMIN — OXYCODONE HYDROCHLORIDE AND ACETAMINOPHEN 2 TABLET: 5; 325 TABLET ORAL at 22:28

## 2018-04-20 RX ADMIN — PROPOFOL 200 MG: 10 INJECTION, EMULSION INTRAVENOUS at 12:43

## 2018-04-20 RX ADMIN — HYDROMORPHONE HYDROCHLORIDE 0.5 MG: 1 INJECTION, SOLUTION INTRAMUSCULAR; INTRAVENOUS; SUBCUTANEOUS at 15:09

## 2018-04-20 RX ADMIN — CEFAZOLIN SODIUM 2 G: 2 INJECTION, SOLUTION INTRAVENOUS at 12:45

## 2018-04-20 RX ADMIN — DEXAMETHASONE SODIUM PHOSPHATE 8 MG: 4 INJECTION, SOLUTION INTRA-ARTICULAR; INTRALESIONAL; INTRAMUSCULAR; INTRAVENOUS; SOFT TISSUE at 13:06

## 2018-04-20 RX ADMIN — ROCURONIUM BROMIDE 40 MG: 10 INJECTION INTRAVENOUS at 12:43

## 2018-04-20 RX ADMIN — HYDROMORPHONE HYDROCHLORIDE 0.2 MG: 1 INJECTION, SOLUTION INTRAMUSCULAR; INTRAVENOUS; SUBCUTANEOUS at 21:52

## 2018-04-20 ASSESSMENT — PAIN DESCRIPTION - DESCRIPTORS: DESCRIPTORS: BURNING

## 2018-04-20 ASSESSMENT — LIFESTYLE VARIABLES: TOBACCO_USE: 0

## 2018-04-20 NOTE — PROGRESS NOTES
MD Notification    Notified Person: MD    Notified Person Name: Dr. Platt     Notification Date/Time: 4/20/18    Notification Interaction: paged    Purpose of Notification: pain medications order    Orders Received:    Comments:

## 2018-04-20 NOTE — IP AVS SNAPSHOT
MRN:[id number]                      After Visit Summary   4/20/2018    Chelle Rocha    MRN: [id number]           Thank you!     Thank you for choosing [facility] for your care. Our goal is always to provide you with excellent care. Hearing back from our patients is one way we can continue to improve our services. Please take a few minutes to complete the written survey that you may receive in the mail after you visit with us. Thank you!        Patient Information     Date Of Birth          1985        Designated Caregiver       Most Recent Value    Caregiver    Will someone help with your care after discharge? yes    Name of designated caregiver Sloane       Phone number of caregiver [phone]    Caregiver [address]      About your hospital stay     You were admitted on:  April 20, 2018 You last received care in the:  [place] [address] [facility]    You were discharged on:  April 21, 2018       Who to Call     For medical emergencies, please call 911.  For non-urgent questions about your medical care, please call your primary care provider or clinic, [phone]  For questions related to your surgery, please call your surgery clinic        Attending Provider     Provider Specialty    Tu Amanda MD OB/Gyn       Primary Care Provider Office Phone # Fax #    Tu Amanda [phone]      After Care Instructions     Diet Instructions       Resume pre-procedure diet            Discharge Instructions       Follow up appointment as instructed by Surgeon and or RN            Ice to affected area       Ice to operative site PRN            No Alcohol       For 24 hours post procedure            No lifting        No lifting over 20 lbs and no strenuous physical activity for 6 weeks            Shower       No shower for 24 hours post procedure. May shower Postoperative Day (POD)  2                  Further instructions from  your care team       Discharge Instructions following Laparoscopic Hysterectomy  [facility] [address]    Please return to the clinic in:       2 weeks;         4 weeks;          6 weeks   Make this appointment after you get home.  Diet:    You may feel less hungry at first.  Try to eat a well balanced diet with lots of proteins, fruits, vegetables, and whole grains.  Avoid spicy and greasy foods.    Drink plenty of fluids - at least 8 tall glasses a day.  Try to limit caffeine (found in coffee, tea, and cola drinks).  This helps prevent constipation (hard stools that are difficult to pass).    If constipation is a problem,  consider one of these medicines: docusate (Colace), docusate with casanthranol (Ivana-Colace), psyllium (Metamucil) or milk of magnesia.  You can buy these at the drug store.  Follow the instructions listed on the label.  Activity:    You will need plenty of rest at first.  Slowly go back to your normal activities.  It will help to take several short walks during the day.  It is ok to climb stairs, but use the handrail in case you get dizzy.    Do not drive while using strong pain medications (narcotics).  After that, do not drive until you can stomp on the brakes without pain.      Do not use tampons, douche, or have sex (intercourse) until you see your doctor again.    Don t lift or push anything over 20 lbs until your doctor says it s safe.  Avoid heavy or strenuous exercise.    Your doctor will tell you when you can safely return to work.  Pain Control:    You may have pain around your incisions (surgery wounds).  This should improve over the next week.  Use your pain medicines as directed.  If you have increased pain, please call the clinic.  It is normal to see a little sore after mild exercise.    For the next two days, you may have some pain in your shoulders and upper chest.  This is from the air pumped into your during the surgery.  To relieve this type of  pain, you may take Tylenol (acetaminophen) or Advil (ibuprofen).  Follow the instructions listed on the label.  Drink a full glass of water with each dose.  You can also try lying flat or rising your hips above shoulder level.  Short, frequent walks should help as well.  Bathing/Incision Care:    May shower as desired but avoid tub baths (submerging incision) until incisions are healed.    Band-Aids may be removed at any time.  If present, Steri-Strips (small, white tape) will fall off on their own in 7-10 days.    Your body will absorb our stitches over time.  Keep them clean and dry.    Wear loose, comfortable clothing.  Normal Symptoms:    You may notice a small amount of bleeding from your vagina.  This could last up to a week.  You may also have brown fluid leaking from the vagina.  This could last for a few weeks.  Wear pads as needed.    You may have bruising on your belly.  You might also have a puffy feeling in your belly.    You may see a little blood or fluid oozing from the incision.  Hormones:    If we removed your ovaries, you may need hormone medicine (HT, or hormone therapy).  Discuss this with your doctor.  If we did not remove your ovaries, you should reach menopause at the normal time (in general, between ages 40 and 55).  Notify your surgeon for the following signs and symptoms:    Severe chills and temperature of 100.4 oF or greater, taken under the tongue.    Bright red blood or large clots coming out of the vagina - enough to soak one pad (sanitary napkin) per hour.    Increased pain, warmth, swelling, redness at incision site.    Foul smelling, green/yellow discharge from incision.    Urine or vaginal fluid that smells bad.    You cannot urinate (use the toilet), it burns when you urinate, or you need to go more often.    Severe nausea (feeling sick to your stomach) or vomiting (throwing up).    Increased pain that you cannot control with medicine.    Any questions or concerns.      Pending  "Results     Date and Time Order Name Status Description    4/20/2018 1410 Surgical pathology exam In process             Admission Information     Date & Time Provider Department Dept. Phone    4/20/2018 Tu Amanda MD [place] [address] [facility] [phone]      Your Vitals Were     Blood Pressure Pulse Temperature Respirations Height Weight    119/66 (BP Location: Left arm) 61 98.6  F (37  C) (Oral) 16 1.549 m (5' 1\") 70.3 kg (155 lb)    Last Period Pulse Oximetry BMI (Body Mass Index)             04/13/2018 95% 29.29 kg/m2         MyChart Information     [facility] gives you secure access to your electronic health record. If you see a primary care provider, you can also send messages to your care team and make appointments. If you have questions, please call your primary care clinic.  If you do not have a primary care provider, please call [phone] and they will assist you.        Care EveryWhere ID     This is your Care EveryWhere ID. This could be used by other organizations to access your [place] medical records  [id number]        Equal Access to Services     DONNA HELTON : Hadii jillian banuelos Soetelvina, waaxda luqadaha, qaybta kaalmashila colin, anh mullins. So [facility] [phone].    ATENCIÓN: Si habla español, tiene a avitia disposición servicios gratuitos de asistencia lingüística. Wilber al [phone].    We comply with applicable federal civil rights laws and [place] laws. We do not discriminate on the basis of race, color, national origin, age, disability, sex, sexual orientation, or gender identity.               Review of your medicines      START taking        Dose / Directions    ibuprofen 600 MG tablet   Commonly known as:  ADVIL/MOTRIN   Used for:  Cystocele with uterine descensus, Stress incontinence, Pelvic adhesions        Dose:  600 mg   Take 1 tablet (600 mg) by mouth every 6 hours as needed for pain (mild)   Quantity:  60 tablet "   Refills:  0       oxyCODONE IR 10 MG tablet   Commonly known as:  ROXICODONE   Used for:  Cystocele with uterine descensus, Stress incontinence, Pelvic adhesions   Notes to Patient:  Last given prior to discharge at 4:30pm        Dose:  10 mg   Take 1 tablet (10 mg) by mouth every 4 hours as needed for other (Moderate pain)   Quantity:  20 tablet   Refills:  0         CONTINUE these medicines which have NOT CHANGED        Dose / Directions    ferrous sulfate 325 (65 Fe) MG tablet   Commonly known as:  IRON        Dose:  325 mg   Take 325 mg by mouth every morning   Refills:  0       HAIR SKIN & NAILS GUMMIES PO        Dose:  2 chew tab   Take 2 chew tab by mouth daily   Refills:  0       HYDROXYZINE HCL PO        Dose:  25 mg   Take 25 mg by mouth daily as needed (Anxiety)   Refills:  0       LAMICTAL PO        Dose:  100 mg   Take 100 mg by mouth every morning   Refills:  0       PROPRANOLOL HCL PO        Dose:  20 mg   Take 20 mg by mouth 2 times daily as needed (Anxiety)   Refills:  0       senna-docusate 8.6-50 MG per tablet   Commonly known as:  SENOKOT-S;PERICOLACE        Dose:  1 tablet   Take 1 tablet by mouth 2 times daily   Refills:  0       SEROQUEL PO        Dose:  25 mg   Take 25 mg by mouth nightly as needed   Refills:  0            Where to get your medicines      Some of these will need a paper prescription and others can be bought over the counter. Ask your nurse if you have questions.     Bring a paper prescription for each of these medications     ibuprofen 600 MG tablet    oxyCODONE IR 10 MG tablet                Protect others around you: Learn how to safely use, store and throw away your medicines at www.disposemymeds.org.        Information about OPIOIDS     PRESCRIPTION OPIOIDS: WHAT YOU NEED TO KNOW   You have a prescription for an opioid (narcotic) pain medicine. Opioids can cause addiction. If you have a history of chemical dependency of any type, you are at a higher risk of becoming  addicted to opioids. Only take this medicine after all other options have been tried. Take it for as short a time and as few doses as possible.     Do not:    Drive. If you drive while taking these medicines, you could be arrested for driving under the influence (DUI).    Operate heavy machinery    Do any other dangerous activities while taking these medicines.     Drink any alcohol while taking these medicines.      Take with any other medicines that contain acetaminophen. Read all labels carefully. Look for the word  acetaminophen  or  Tylenol.  Ask your pharmacist if you have questions or are unsure.    Store your pills in a secure place, locked if possible. We will not replace any lost or stolen medicine. If you don t finish your medicine, please throw away (dispose) as directed by your pharmacist. The [place] Pollution Control Agency has more information about safe disposal: https://www.pca.[facility].[place].us/living-green/managing-unwanted-medications    All opioids tend to cause constipation. Drink plenty of water and eat foods that have a lot of fiber, such as fruits, vegetables, prune juice, apple juice and high-fiber cereal. Take a laxative (Miralax, milk of magnesia, Colace, Senna) if you don t move your bowels at least every other day.              Medication List: This is a list of all your medications and when to take them. Check marks below indicate your daily home schedule. Keep this list as a reference.      Medications           Morning Afternoon Evening Bedtime As Needed    ferrous sulfate 325 (65 Fe) MG tablet   Commonly known as:  IRON   Take 325 mg by mouth every morning   Last time this was given:  325 mg on 4/21/2018  7:22 AM                                   HAIR SKIN & NAILS GUMMIES PO   Take 2 chew tab by mouth daily                                   HYDROXYZINE HCL PO   Take 25 mg by mouth daily as needed (Anxiety)                                   ibuprofen 600 MG tablet   Commonly known as:   ADVIL/MOTRIN   Take 1 tablet (600 mg) by mouth every 6 hours as needed for pain (mild)                                   LAMICTAL PO   Take 100 mg by mouth every morning   Last time this was given:  100 mg on 4/21/2018  7:22 AM                                   oxyCODONE IR 10 MG tablet   Commonly known as:  ROXICODONE   Take 1 tablet (10 mg) by mouth every 4 hours as needed for other (Moderate pain)   Last time this was given:  10 mg on 4/21/2018 12:24 PM   Notes to Patient:  Last given prior to discharge at 4:30pm                                   PROPRANOLOL HCL PO   Take 20 mg by mouth 2 times daily as needed (Anxiety)                                   senna-docusate 8.6-50 MG per tablet   Commonly known as:  SENOKOT-S;PERICOLACE   Take 1 tablet by mouth 2 times daily   Last time this was given:  1 tablet on 4/21/2018  7:22 AM                                      SEROQUEL PO   Take 25 mg by mouth nightly as needed   Last time this was given:  25 mg on 4/20/2018  7:18 PM

## 2018-04-20 NOTE — ANESTHESIA CARE TRANSFER NOTE
Patient: Chelle Rocha    Procedure(s):  DAVINCI XI SUPRACERVICAL HYSTERECTOMY,  SACROCOLPOPEXY, LYSIS OF ADHESIONS, CYSTOSCOPY, POSSIBLE TRANSVAGINAL TAPE SLING  - Wound Class: II-Clean Contaminated   - Wound Class: II-Clean Contaminated   - Wound Class: II-Clean Contaminated    Diagnosis: UTEROVAGINAL PROLAPSE, CYSTOCELE, AND STRESS INCONTINENCE  Diagnosis Additional Information: No value filed.    Anesthesia Type:   General, ETT     Note:  Airway :Face Mask  Patient transferred to:PACU  Comments: Transferred to PACU, spontaneous respirations, 10L oxygen via facemask.  All monitors and alarms on and functioning, VSS.  Patient awake, comfortable.  Report to PACU RN.Handoff Report: Identifed the Patient, Identified the Reponsible Provider, Reviewed the pertinent medical history, Discussed the surgical course, Reviewed Intra-OP anesthesia mangement and issues during anesthesia, Set expectations for post-procedure period and Allowed opportunity for questions and acknowledgement of understanding      Vitals: (Last set prior to Anesthesia Care Transfer)    CRNA VITALS  4/20/2018 1420 - 4/20/2018 1459      4/20/2018             Pulse: 82    SpO2: 98 %    Resp Rate (set): 10                Electronically Signed By: LAURA Swift CRNA  April 20, 2018  2:59 PM

## 2018-04-20 NOTE — IP AVS SNAPSHOT
[facility] [facility]    [address] [place] [address]    Phone:  [phone]                                       After Visit Summary   4/20/2018    Chelle Rocha    MRN: [id number]           After Visit Summary Signature Page     I have received my discharge instructions, and my questions have been answered. I have discussed any challenges I see with this plan with the nurse or doctor.    ..........................................................................................................................................  Patient/Patient Representative Signature      ..........................................................................................................................................  Patient Representative Print Name and Relationship to Patient    ..................................................               ................................................  Date                                            Time    ..........................................................................................................................................  Reviewed by Signature/Title    ...................................................              ..............................................  Date                                                            Time

## 2018-04-20 NOTE — ANESTHESIA POSTPROCEDURE EVALUATION
Patient: Chelle Rocha    Procedure(s):  DAVINCI XI SUPRACERVICAL HYSTERECTOMY,  SACROCOLPOPEXY, LYSIS OF ADHESIONS, CYSTOSCOPY, POSSIBLE TRANSVAGINAL TAPE SLING  - Wound Class: II-Clean Contaminated   - Wound Class: II-Clean Contaminated   - Wound Class: II-Clean Contaminated    Diagnosis:UTEROVAGINAL PROLAPSE, CYSTOCELE, AND STRESS INCONTINENCE  Diagnosis Additional Information: No value filed.    Anesthesia Type:  General, ETT    Note:  Anesthesia Post Evaluation    Patient location during evaluation: PACU  Patient participation: Able to fully participate in evaluation  Level of consciousness: awake  Pain management: adequate  Airway patency: patent  Cardiovascular status: acceptable  Respiratory status: acceptable  Hydration status: acceptable  PONV: controlled     Anesthetic complications: None          Last vitals:  Vitals:    04/20/18 1655 04/20/18 1705 04/20/18 1723   BP:   121/71   Pulse:   67   Resp: 15 13 9   Temp:   36.6  C (97.8  F)   SpO2: 96%  93%         Electronically Signed By: Junie Mejia MD  April 20, 2018  6:04 PM
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
## 2018-04-21 VITALS
BODY MASS INDEX: 29.27 KG/M2 | RESPIRATION RATE: 16 BRPM | HEIGHT: 61 IN | HEART RATE: 61 BPM | OXYGEN SATURATION: 95 % | DIASTOLIC BLOOD PRESSURE: 66 MMHG | WEIGHT: 155 LBS | TEMPERATURE: 98.6 F | SYSTOLIC BLOOD PRESSURE: 119 MMHG

## 2018-04-21 PROCEDURE — 25000132 ZZH RX MED GY IP 250 OP 250 PS 637: Performed by: OBSTETRICS & GYNECOLOGY

## 2018-04-21 PROCEDURE — 25000128 H RX IP 250 OP 636: Performed by: OBSTETRICS & GYNECOLOGY

## 2018-04-21 RX ORDER — OXYCODONE HYDROCHLORIDE 10 MG/1
10 TABLET ORAL EVERY 4 HOURS PRN
Qty: 20 TABLET | Refills: 0 | Status: ON HOLD | OUTPATIENT
Start: 2018-04-21 | End: 2018-07-10

## 2018-04-21 RX ORDER — OXYCODONE HYDROCHLORIDE 5 MG/1
10 TABLET ORAL EVERY 4 HOURS PRN
Status: DISCONTINUED | OUTPATIENT
Start: 2018-04-21 | End: 2018-04-21 | Stop reason: HOSPADM

## 2018-04-21 RX ADMIN — KETOROLAC TROMETHAMINE 30 MG: 30 INJECTION, SOLUTION INTRAMUSCULAR at 10:09

## 2018-04-21 RX ADMIN — LAMOTRIGINE 100 MG: 100 TABLET ORAL at 07:22

## 2018-04-21 RX ADMIN — SENNOSIDES AND DOCUSATE SODIUM 1 TABLET: 8.6; 5 TABLET ORAL at 07:22

## 2018-04-21 RX ADMIN — OXYCODONE HYDROCHLORIDE AND ACETAMINOPHEN 2 TABLET: 5; 325 TABLET ORAL at 07:22

## 2018-04-21 RX ADMIN — KETOROLAC TROMETHAMINE 30 MG: 30 INJECTION, SOLUTION INTRAMUSCULAR at 02:44

## 2018-04-21 RX ADMIN — OXYCODONE HYDROCHLORIDE 10 MG: 5 TABLET ORAL at 16:28

## 2018-04-21 RX ADMIN — OXYCODONE HYDROCHLORIDE AND ACETAMINOPHEN 2 TABLET: 5; 325 TABLET ORAL at 02:43

## 2018-04-21 RX ADMIN — OXYCODONE HYDROCHLORIDE 10 MG: 5 TABLET ORAL at 12:24

## 2018-04-21 RX ADMIN — FERROUS SULFATE TAB 325 MG (65 MG ELEMENTAL FE) 325 MG: 325 (65 FE) TAB at 07:22

## 2018-04-21 NOTE — PLAN OF CARE
Problem: Hysterectomy (Adult)  Goal: Signs and Symptoms of Listed Potential Problems Will be Absent, Minimized or Managed (Hysterectomy)  Signs and symptoms of listed potential problems will be absent, minimized or managed by discharge/transition of care (reference Hysterectomy (Adult) CPG).   Outcome: Adequate for Discharge Date Met: 04/21/18  Pt up ad tamy voiding 300-500ml post alvarado removal from earlier this morning. Pt reports oxycodone prn for pain sufficient for pain relief. Did take one dose of torodol prn this morning. Saline lock removed per her request and discharging this waleska. Lap sites intact  abdomen appears less distended then this morning assessment. Denies vaginal drainage. Tolerating liquids noted positive bowel sounds reports no flatus reports no nausea states voiding is easier this afternoon .

## 2018-04-21 NOTE — DISCHARGE INSTRUCTIONS
Discharge Instructions following Laparoscopic Hysterectomy  [facility]    Please return to the clinic in:       2 weeks;         4 weeks;          6 weeks   Make this appointment after you get home.  Diet:    You may feel less hungry at first.  Try to eat a well balanced diet with lots of proteins, fruits, vegetables, and whole grains.  Avoid spicy and greasy foods.    Drink plenty of fluids - at least 8 tall glasses a day.  Try to limit caffeine (found in coffee, tea, and cola drinks).  This helps prevent constipation (hard stools that are difficult to pass).    If constipation is a problem,  consider one of these medicines: docusate (Colace), docusate with casanthranol (Ivana-Colace), psyllium (Metamucil) or milk of magnesia.  You can buy these at the drug store.  Follow the instructions listed on the label.  Activity:    You will need plenty of rest at first.  Slowly go back to your normal activities.  It will help to take several short walks during the day.  It is ok to climb stairs, but use the handrail in case you get dizzy.    Do not drive while using strong pain medications (narcotics).  After that, do not drive until you can stomp on the brakes without pain.      Do not use tampons, douche, or have sex (intercourse) until you see your doctor again.    Don t lift or push anything over 20 lbs until your doctor says it s safe.  Avoid heavy or strenuous exercise.    Your doctor will tell you when you can safely return to work.  Pain Control:    You may have pain around your incisions (surgery wounds).  This should improve over the next week.  Use your pain medicines as directed.  If you have increased pain, please call the clinic.  It is normal to see a little sore after mild exercise.    For the next two days, you may have some pain in your shoulders and upper chest.  This is from the air pumped into your during the surgery.  To relieve this type of pain, you may take  Tylenol (acetaminophen) or Advil (ibuprofen).  Follow the instructions listed on the label.  Drink a full glass of water with each dose.  You can also try lying flat or rising your hips above shoulder level.  Short, frequent walks should help as well.  Bathing/Incision Care:    May shower as desired but avoid tub baths (submerging incision) until incisions are healed.    Band-Aids may be removed at any time.  If present, Steri-Strips (small, white tape) will fall off on their own in 7-10 days.    Your body will absorb our stitches over time.  Keep them clean and dry.    Wear loose, comfortable clothing.  Normal Symptoms:    You may notice a small amount of bleeding from your vagina.  This could last up to a week.  You may also have brown fluid leaking from the vagina.  This could last for a few weeks.  Wear pads as needed.    You may have bruising on your belly.  You might also have a puffy feeling in your belly.    You may see a little blood or fluid oozing from the incision.  Hormones:    If we removed your ovaries, you may need hormone medicine (HT, or hormone therapy).  Discuss this with your doctor.  If we did not remove your ovaries, you should reach menopause at the normal time (in general, between ages 40 and 55).  Notify your surgeon for the following signs and symptoms:    Severe chills and temperature of 100.4 oF or greater, taken under the tongue.    Bright red blood or large clots coming out of the vagina - enough to soak one pad (sanitary napkin) per hour.    Increased pain, warmth, swelling, redness at incision site.    Foul smelling, green/yellow discharge from incision.    Urine or vaginal fluid that smells bad.    You cannot urinate (use the toilet), it burns when you urinate, or you need to go more often.    Severe nausea (feeling sick to your stomach) or vomiting (throwing up).    Increased pain that you cannot control with medicine.    Any questions or concerns.

## 2018-04-21 NOTE — PLAN OF CARE
Problem: Patient Care Overview  Goal: Plan of Care/Patient Progress Review  Outcome: Improving  A&O. VSS. Refused capno at 0530 and 0600 Blood glucose.. Scant amount of vaginal bleeding. Incisions CDI, no s/s of infection. Pain controlled with prn Percocet and toradol. Baumann removed and pt DTV. Tolerated bedside dangle and reg diet well.

## 2018-04-21 NOTE — PROGRESS NOTES
[facility] Gynecology Post-Op / Progress Note    Subjective:   Post-operative day #1  TLH     Doing well.  Would like incr in dose          Interval History:   Doing well.  Continues to improve.  Pain is well-controlled.  No fevers.            Significant Problems:    None          Review of Systems:    The patient denies any chest pain, shortness of breath, excessive pain, fever, chills, purulent drainage from the wound, nausea or vomiting.          Medications:   All medications related to the patient's surgery have been reviewed          Physical Exam:   All vitals stable  Wound clean and dry with minimal or no drainage.  Surrounding skin with minimal erythema.          Data:   All laboratory data related to this surgery reviewed  All imaging studies related to this surgery reviewed         Assessment and Plan:    Assessment:   Post-operative day #1  TLH     Doing well.  Normal healing wound.  Will incr dose of oxy      Plan:   Dc to home later this afternoon if pain control adiel Ramos MD

## 2018-04-21 NOTE — PROGRESS NOTES
AVSS. Patient belongings packed by patient. Lung sounds clear. Bowel sounds active; no flatus yet. Instructed patient to continue to walk to help move bowels and gas. Incisions are CELESTINE and CDI with liquid bandage. Patient verbalized understanding of AVS and follow up appointments. Patient verbalized dosage and frequency of prescribed medications (oxycodone and ibuprofen). Patient transported home by car with family.

## 2018-04-21 NOTE — PROGRESS NOTES
Nadia CARVAJAL Did bladder scan and got 353 pt now  up to bathroom and did void 350 yellow colored urine. Pt states it did take awhile to get urine to flow when sitting on toilet.

## 2018-04-21 NOTE — PLAN OF CARE
Problem: Hysterectomy (Adult)  Goal: Signs and Symptoms of Listed Potential Problems Will be Absent, Minimized or Managed (Hysterectomy)  Signs and symptoms of listed potential problems will be absent, minimized or managed by discharge/transition of care (reference Hysterectomy (Adult) CPG).   Outcome: Improving  A/o x4. VSS. RA. IV SL. Tolerating clears and crackers after zofran x1. Baumann. CMS intact. Lap sites, CDI, dermabound. BS hypoactive. Scant vaginal bleeding.  Baumann, good output. Pain managed with IV Dilaudid, Toradol and hot packs. Switch to PO pain meds at end of shift. Dangling delayed due to pain control. Plan to d/c tomorrow.

## 2018-04-23 LAB — COPATH REPORT: NORMAL

## 2018-04-26 ENCOUNTER — TELEPHONE (OUTPATIENT)
Dept: SURGERY | Facility: CLINIC | Age: 33
End: 2018-04-26

## 2018-04-26 ENCOUNTER — OFFICE VISIT (OUTPATIENT)
Dept: SURGERY | Facility: CLINIC | Age: 33
End: 2018-04-26
Payer: COMMERCIAL

## 2018-04-26 VITALS
DIASTOLIC BLOOD PRESSURE: 73 MMHG | WEIGHT: 155 LBS | HEART RATE: 72 BPM | SYSTOLIC BLOOD PRESSURE: 116 MMHG | BODY MASS INDEX: 29.27 KG/M2 | HEIGHT: 61 IN

## 2018-04-26 DIAGNOSIS — K42.9 UMBILICAL HERNIA WITHOUT OBSTRUCTION AND WITHOUT GANGRENE: Primary | ICD-10-CM

## 2018-04-26 PROCEDURE — 99214 OFFICE O/P EST MOD 30 MIN: CPT | Performed by: SURGERY

## 2018-04-26 NOTE — OP NOTE
Procedure Date: 04/20/2018      DATE OF PROCEDURE:  04/20/2018      PREOPERATIVE DIAGNOSES:   1.  Uterovaginal prolapse - incomplete.   2.  Cystocele.   3.  Stress urinary incontinence.      POSTOPERATIVE DIAGNOSIS:     1.  Uterovaginal prolapse - incomplete.   2.  Cystocele.   3.  Stress urinary incontinence.   4.  Pelvic adhesive disease.      PROCEDURES:   1.  Da Alvaro supracervical hysterectomy.   2.  Sacrocolpopexy.   3.  Cystocele repair.   4.  Lysis of adhesions.   5.  Advantage Fit sling placement with cystoscopy.      SURGEON:  Tu Amanda MD      ASSISTANT:  Keri Lovett PA-C      ESTIMATED BLOOD LOSS:  50 mL.      COMPLICATIONS:  None.      SPECIMENS:  Uterus.      FINDINGS:  The patient had filmy adhesions between the area of her bilateral salpingectomy and the right anterior abdominal wall.  These were taken down.  Minimal adhesions were noted in the left side.  We did a cystoscopy after the sacrocolpopexy and after the sling placement.  There was no injury to the bladder and the Uro-Jets were functional on the right and left.      DESCRIPTION OF OPERATIVE PROCEDURE:  After obtaining informed consent, the patient was prepped and draped in the usual manner for an abdominal vaginal procedure.  The Christiano surgical vaginal manipulator probe was inserted in the vagina after a Baumann catheter had been inserted.  Gloves were changed.  Attention was turned to the abdomen.      We created a 3 cm umbilical incision and carried this down to the level of the fascia.  We encountered our old Ethibond sutures and these were cut, and the fascia was  and we extended the incision superiorly and inferiorly.  I entered the parietoperitoneum bluntly and we placed our wound retractor element of the GelPOINT port by K[place]et.  We then placed the cap of the appropriate assistance ports and camera port.  Pneumoperitoneum was created and I placed bilateral da Alvaro ports to the right and left under  direct visualization.  The patient was placed in steep Trendelenburg and the da Alvaro robot was docked.  I then took my place at the operative console.      We cut down filmy adhesions using the hot alvarez.  These were taken down and removed from the field.  We then isolated the sacral promontory and staying to the right of the midline vasculature we created a hole in the peritoneum and dissected down until we could see the white anterior longitudinal ligament.  We then tunneled in the distribution of the right uterosacral ligament and came out near the cervix where we opened the peritoneum and completed our peritoneal tunnel for our mesh strap.      The patient's fallopian tubes had largely been removed in a prior procedure.  We electrocoagulated the left utero-ovarian ligament x 2 with fenestrated bipolar grasper and cut with scissors.  We did the same with the round ligament on the left x 2 and we cut with scissors.  We then entered the broad ligament on the left and bluntly  the anterior and posterior leaves.  We cut the posterior leaf down to the uterosacral ligament attachment on the left side.  Anteriorly we cut it down to the vesicouterine peritoneal fold.  We carried the dissection across the specimen to create a bladder flap approximately 5-6 cm deep.  I then electrocoagulated the uterine artery and vein on the left side at the cervical isthmus x 2 with fenestrated bipolar grasper.  We performed a similar dissection on the patient's right side, and without difficulty.  We then amputated the uterus at the cervical isthmus with the scissors and we sewed in the upper abdomen for later morselization.  Isolated cervical stump bleeders were electrocoagulated and we oversewed the cervix using a nam suture cut and a 6-inch length of 0 Covidien V-Loc 180 stitch.  Once this was complete, we grasped the posterior peritoneum on the posterior wall of the vagina.  This was entered with the scissors and using  largely blunt and a little bit of sharp dissection, we made a 8 cm posterior pocket and carried it down close to the rectum.  I then cut our Artisyn mesh to fit the dimensions of the dissection and it was put into place.  We attached it anteriorly with 6 simple interrupted stitches of 2-0 [place].  We then attached it posteriorly with four or five 2-0 [place] simple interrupted stitches.  At this point, the mesh strap was then pulled through our peritoneal tunnel and under appropriate tension we affixed it to the anterior longitudinal ligament in the usual manner with 2 stitches of 2-0 Ethibond.  Excess mesh was cut at the promontory and I closed the promontory peritoneal defect with a figure-of-eight stitch of 3-0 Vicryl.  The lower field peritoneum was reapproximated using 3-0 Covidien V-Loc 180 stitch in a running manner.  The stitch was then cut and the needle was removed from the field.  At this point, I handed the specimen to my assistant with locking graspers through our GelPOINT cap and I scrubbed back into the case.      The da Alvaro robot was undocked and pulled back.  The GelPOINT cap was removed once the patient's position was normalized.  We were able to grasp the specimen and with a few relief cuts were able to easily pull it through the umbilicus.  The wound retractor element of the GelPOINT was then removed and we closed the fascia with 0 Loop Maxon in a running stitch.  Skin was closed with 4-0 Vicryl in a subcuticular stitch.  The wound was sealed with Dermabond.      I took my place at the lower field and her support had largely normalized.  I could still see a bit of a cyst urethrocele and we elected to move forward with the Advantage Fit sling procedure.  I removed the Baumann catheter.  I grasped the midureteral fold with an Allis clamp and the anterior vaginal epithelium with an Allis clamp, and we hydrodissected with Marcaine and epinephrine out to the undersurface of the symphysis pubis to  the right and left.  I used a #15 blade scalpel to create a 1 cm incision in the vagina and using Gennaro scissors was used blunt and sharp technique to create a tunnel from the incision out to the undersurface of the symphysis pubis to the right and left of the urethra.  The Advantage Fit sling was then placed behind the symphysis pubis and the trocar came out 2 cm from midline on the right and left.  Mesh was pulled through and we used a 9 mm Hegar dilator as a spacer.  Cystoscopy had been performed and there was no trocar injury.  We had good Uro-Jets.  Plastic sheathing was removed to deploy the mesh, and Hegar's dilator was then removed and we closed the vaginal epithelium with 2-0 Vicryl in a running locking stitch.  Excess mesh was cut at the mons pubis and we closed those skin sites with Dermabond.  Needle, sponge and instrument counts were correct.  The patient tolerated the procedure well.  Baumann catheter had been reinserted.      DISPOSITION:  The patient is in satisfactory stable condition and is in recovery.         HASEEB RAMIREZ MD             D: 2018   T: 2018   MT: ULICES      Name:     CAL OSORIO   MRN:      [id number]        Account:        [id number]   :      1985           Procedure Date: 2018      Document: [id number]

## 2018-04-26 NOTE — LETTER
[facility]  Surgery Consultation     2018    Re: Chelle Rocha, : 1985    CONSULTATION REQUESTED BY:  Tu Amanda [phone]     HPI: Patient is a 32-year-old female known to me from prior consultation for umbilical hernia that was noted during pregnancy.  She returns now postpartum.  She also recently underwent da Alvaro sacral colpopexy, bladder sling as well as hysterectomy.  She persists in having a hernia.  This causes her occasional mild discomfort.  She has no disruption of her normal GI function.     PMH:   has a past medical history of Abnormal Pap smear; Anemia; Bipolar 1 disorder (H); Breast disorder; History of blood transfusion; Migraine; PONV (postoperative nausea and vomiting); Postpartum depression; Renal disease; Rh incompatibility; Seasonal allergies; TMJ (temporomandibular joint syndrome); UTI in pregnancy; and Wounds and injuries.  PSH:    has a past surgical history that includes WISDOM TEETH EXTRACTION[; GYN surgery; Arthroscopy temporomandibular joint (TMJ) (2012); leep tx, cervical (); Colposcopy, biopsy, combined (); REMOVAL OF OVARIAN CYST(S) (); Davinci Single Site Pelviscopy (N/A, 2014); Operative hysteroscopy with morcellator (N/A, 2014); Breast surgery (, ); ENT surgery (2003); Laparotomy, tubal ligation, combined (N/A, 2017); Laparoscopic cholecystectomy with cholangiograms (N/A, 2018); daVINCI hysterectomy supracervical (N/A, 2018); DAVINCI SACROCOLPOPEXY, MIDURETHRAL SLING, CYSTOSCOPY (N/A, 2018); and Davinci Lysis of Adhesions (N/A, 2018).  Social History:   reports that she has never smoked. She has never used smokeless tobacco. She reports that she does not drink alcohol or use illicit drugs.  Family History:  family history is not on file.  Medications/Allergies: Home medications and allergies reviewed.     ROS:  The 10 point Review of Systems is negative other than noted  "in the HPI.     Physical Exam:  /73  Pulse 72  Ht 5' 1\" (1.549 m)  Wt 155 lb (70.3 kg)  LMP 04/13/2018  BMI 29.29 kg/m2  GENERAL: Generally appears well.  Psych: Alert and Oriented.  Normal affect  Eyes: Sclera clear  Respiratory:  Lungs clear to ausculation bilaterally with good air excursion  Cardiovascular:  Regular Rate and Rhythm with no murmurs gallops or rubs, normal peripheral pulses  GI: Abdomen Non Distended Non-Tender  Incisional hernia palpated within the umbilicus as well as underlying diastases  Lymphatic/Hematologic/Immune:  No femoral or cervical lymphadenopathy.  Integumentary:  No rashes  Neurological: grossly intact      All new lab and imaging data was reviewed.      Impression and Plan:  Patient is a 32 year old female with umbilical/incisional hernia with diastases recti     PLAN: Recommend robotic repair at her convenience.  I discussed the pathophysiology of hernias and options for repair including laparoscopic VS open.  The risks associated with the procedure including, but not limited to, recurrence, nerve entrapment or injury, persistence of pain, injury to the bowel/bladder, infertility, hematoma, mesh migration, mesh infection, MI, and PE were discussed with the patient. She indicated understanding of the discussion, asked appropriate questions, and provided consent. Signs and symptoms of incarceration were discussed. If these develop in the interim, she promises to call or go straight to the ER. I have provided the patient with an information pamphlet.  Thank you very much for this consult.     Wyatt Golden M.D.  [facility]  [phone]  "

## 2018-04-26 NOTE — BRIEF OP NOTE
[facility] Brief Operative Note    Pre-operative diagnosis: UTEROVAGINAL PROLAPSE, CYSTOCELE, AND STRESS INCONTINENCE   Post-operative diagnosis * No post-op diagnosis entered *  Same.   Procedure: Procedure(s):  DAVINCI XI SUPRACERVICAL HYSTERECTOMY,  SACROCOLPOPEXY, LYSIS OF ADHESIONS, CYSTOSCOPY, POSSIBLE TRANSVAGINAL TAPE SLING  - Wound Class: II-Clean Contaminated   - Wound Class: II-Clean Contaminated   - Wound Class: II-Clean Contaminated   Surgeon(s): Surgeon(s) and Role:     * Tu Amanda MD - Primary     * Keri Lovett PA-C - Assisting   Estimated blood loss: 50 mL    Specimens:   ID Type Source Tests Collected by Time Destination   A : UTERUS Tissue Uterus SURGICAL PATHOLOGY EXAM Tu Amanda MD 4/20/2018  2:08 PM       Findings: No trocar injury to bladder. We used a 9 mm Heger dilator as a spacer. Good urojets after the sacral colpopexy. She did have a sheet of adhesions from tubal ligation to the right anterior pelvic sidewall. These were taken down without difficulty.

## 2018-04-26 NOTE — PROGRESS NOTES
[facility]  Surgery Consultation    CONSULTATION REQUESTED BY:  Tu Amanda [phone]    HPI: Patient is a 32-year-old female known to me from prior consultation for umbilical hernia that was noted during pregnancy.  She returns now postpartum.  She also recently underwent da Alvaro sacral colpopexy, bladder sling as well as hysterectomy.  She persists in having a hernia.  This causes her occasional mild discomfort.  She has no disruption of her normal GI function.    PMH:   has a past medical history of Abnormal Pap smear; Anemia; Bipolar 1 disorder (H); Breast disorder; History of blood transfusion; Migraine; PONV (postoperative nausea and vomiting); Postpartum depression; Renal disease; Rh incompatibility; Seasonal allergies; TMJ (temporomandibular joint syndrome); UTI in pregnancy; and Wounds and injuries.  PSH:    has a past surgical history that includes WISDOM TEETH EXTRACTION[; GYN surgery; Arthroscopy temporomandibular joint (TMJ) (7/12/2012); leep tx, cervical (2005); Colposcopy, biopsy, combined (2005); REMOVAL OF OVARIAN CYST(S) (2006); Davinci Single Site Pelviscopy (N/A, 9/25/2014); Operative hysteroscopy with morcellator (N/A, 9/25/2014); Breast surgery (2011, 2013); ENT surgery (12/2003); Laparotomy, tubal ligation, combined (N/A, 12/9/2017); Laparoscopic cholecystectomy with cholangiograms (N/A, 1/24/2018); daVINCI hysterectomy supracervical (N/A, 4/20/2018); DAVINCI SACROCOLPOPEXY, MIDURETHRAL SLING, CYSTOSCOPY (N/A, 4/20/2018); and Davinci Lysis of Adhesions (N/A, 4/20/2018).  Social History:   reports that she has never smoked. She has never used smokeless tobacco. She reports that she does not drink alcohol or use illicit drugs.  Family History:  family history is not on file.  Medications/Allergies: Home medications and allergies reviewed.    ROS:  The 10 point Review of Systems is negative other than noted in the HPI.    Physical Exam:  /73  Pulse 72  Ht 5'  "1\" (1.549 m)  Wt 155 lb (70.3 kg)  LMP 04/13/2018  BMI 29.29 kg/m2  GENERAL: Generally appears well.  Psych: Alert and Oriented.  Normal affect  Eyes: Sclera clear  Respiratory:  Lungs clear to ausculation bilaterally with good air excursion  Cardiovascular:  Regular Rate and Rhythm with no murmurs gallops or rubs, normal peripheral pulses  GI: Abdomen Non Distended Non-Tender  Incisional hernia palpated within the umbilicus as well as underlying diastases  Lymphatic/Hematologic/Immune:  No femoral or cervical lymphadenopathy.  Integumentary:  No rashes  Neurological: grossly intact     All new lab and imaging data was reviewed.     Impression and Plan:  Patient is a 32 year old female with umbilical/incisional hernia with diastases recti    PLAN: Recommend robotic repair at her convenience.  I discussed the pathophysiology of hernias and options for repair including laparoscopic VS open.  The risks associated with the procedure including, but not limited to, recurrence, nerve entrapment or injury, persistence of pain, injury to the bowel/bladder, infertility, hematoma, mesh migration, mesh infection, MI, and PE were discussed with the patient. She indicated understanding of the discussion, asked appropriate questions, and provided consent. Signs and symptoms of incarceration were discussed. If these develop in the interim, she promises to call or go straight to the ER. I have provided the patient with an information pamphlet.    Thank you very much for this consult.    Wyatt Golden M.D.  [facility]  [phone]    Please route or send letter to:  Primary Care Provider (PCP) and Referring Provider  "

## 2018-04-26 NOTE — MR AVS SNAPSHOT
"              After Visit Summary   4/26/2018    Chelle Rocha    MRN: [id number]           Patient Information     Date Of Birth          1985        Visit Information        Provider Department      4/26/2018 10:15 AM Wyatt Golden MD [facility]       Follow-ups after your visit        Who to contact     If you have questions or need follow up information about today's clinic visit or your schedule please contact [facility] directly at [phone].  Normal or non-critical lab and imaging results will be communicated to you by [email or web address], letter or phone within 4 business days after the clinic has received the results. If you do not hear from us within 7 days, please contact the clinic through [email or web address] or phone. If you have a critical or abnormal lab result, we will notify you by phone as soon as possible.  Submit refill requests through [employer] or call your pharmacy and they will forward the refill request to us. Please allow 3 business days for your refill to be completed.          Additional Information About Your Visit        MyChart Information     [employer] gives you secure access to your electronic health record. If you see a primary care provider, you can also send messages to your care team and make appointments. If you have questions, please call your primary care clinic.  If you do not have a primary care provider, please call [phone] and they will assist you.        Care EveryWhere ID     This is your Care EveryWhere ID. This could be used by other organizations to access your [place] medical records  [id number]        Your Vitals Were     Pulse Height Last Period BMI (Body Mass Index)          72 5' 1\" (1.549 m) 04/13/2018 29.29 kg/m2         Blood Pressure from Last 3 Encounters:   04/26/18 116/73   04/21/18 119/66   01/24/18 137/90    Weight from Last 3 Encounters:   04/26/18 155 lb (70.3 kg) "   04/20/18 155 lb (70.3 kg)   01/24/18 149 lb (67.6 kg)              Today, you had the following     No orders found for display       Primary Care Provider Office Phone # Fax #    Tu Amanda [phone]       OB GYN [address]  [facility] [address]        Equal Access to Services     [facility]: Hadii aad ku hadasho Soomaali, waaxda luqadaha, qaybta kaalmada adeegyada, waxay idiin hayaan adeeg kharash la'aan ah. So [facility] [phone].    ATENCIÓN: Si habla español, tiene a avitia disposición servicios gratuitos de asistencia lingüística. Wilber al [phone].    We comply with applicable federal civil rights laws and [place] laws. We do not discriminate on the basis of race, color, national origin, age, disability, sex, sexual orientation, or gender identity.            Thank you!     Thank you for choosing [facility]  for your care. Our goal is always to provide you with excellent care. Hearing back from our patients is one way we can continue to improve our services. Please take a few minutes to complete the written survey that you may receive in the mail after your visit with us. Thank you!             Your Updated Medication List - Protect others around you: Learn how to safely use, store and throw away your medicines at www.disposemymeds.org.          This list is accurate as of 4/26/18 11:03 AM.  Always use your most recent med list.                   Brand Name Dispense Instructions for use Diagnosis    ferrous sulfate 325 (65 Fe) MG tablet    IRON     Take 325 mg by mouth every morning        HAIR SKIN & NAILS GUMMIES PO      Take 2 chew tab by mouth daily        HYDROXYZINE HCL PO      Take 25 mg by mouth daily as needed (Anxiety)        ibuprofen 600 MG tablet    ADVIL/MOTRIN    60 tablet    Take 1 tablet (600 mg) by mouth every 6 hours as needed for pain (mild)    Uterovaginal prolapse, incomplete, Cystocele with uterine descensus, Stress  incontinence, Pelvic adhesions       LAMICTAL PO      Take 100 mg by mouth every morning        oxyCODONE IR 10 MG tablet    ROXICODONE    20 tablet    Take 1 tablet (10 mg) by mouth every 4 hours as needed for other (Moderate pain)    Uterovaginal prolapse, incomplete, Cystocele with uterine descensus, Stress incontinence, Pelvic adhesions       PROPRANOLOL HCL PO      Take 20 mg by mouth 2 times daily as needed (Anxiety)        senna-docusate 8.6-50 MG per tablet    SENOKOT-S;PERICOLACE     Take 1 tablet by mouth 2 times daily        SEROQUEL PO      Take 25 mg by mouth nightly as needed

## 2018-04-26 NOTE — TELEPHONE ENCOUNTER
Type of surgery: Davinci ventral hernia repair   Location of surgery: [facility]  Date and time of surgery: 6/19/18 at 7:30am  Surgeon: Dr. Wyatt Golden  Pre-Op Appt Date: Patient to schedule  Post-Op Appt Date: Patient to schedule   Packet sent out: Yes  Pre-cert/Authorization completed:  Not Applicable  Date: 4/26/18

## 2018-07-10 ENCOUNTER — SURGERY (OUTPATIENT)
Age: 33
End: 2018-07-10

## 2018-07-10 ENCOUNTER — ANESTHESIA EVENT (OUTPATIENT)
Dept: SURGERY | Facility: CLINIC | Age: 33
End: 2018-07-10
Payer: COMMERCIAL

## 2018-07-10 ENCOUNTER — HOSPITAL ENCOUNTER (OUTPATIENT)
Facility: CLINIC | Age: 33
Discharge: HOME | End: 2018-07-11
Attending: SURGERY | Admitting: SURGERY
Payer: COMMERCIAL

## 2018-07-10 ENCOUNTER — ANESTHESIA (OUTPATIENT)
Dept: SURGERY | Facility: CLINIC | Age: 33
End: 2018-07-10
Payer: COMMERCIAL

## 2018-07-10 ENCOUNTER — OFFICE VISIT (OUTPATIENT)
Dept: SURGERY | Facility: PHYSICIAN GROUP | Age: 33
End: 2018-07-10
Payer: COMMERCIAL

## 2018-07-10 DIAGNOSIS — G89.18 POST-OP PAIN: Primary | ICD-10-CM

## 2018-07-10 PROBLEM — K43.9 VENTRAL HERNIA: Status: ACTIVE | Noted: 2018-07-10

## 2018-07-10 PROCEDURE — 40000936 ZZH STATISTIC OUTPATIENT (NON-OBS) NIGHT

## 2018-07-10 PROCEDURE — 49652 ZZHC LAP VENT/ABD HERNIA REPAIR: CPT | Mod: AS | Performed by: PHYSICIAN ASSISTANT

## 2018-07-10 PROCEDURE — C1781 MESH (IMPLANTABLE): HCPCS | Performed by: SURGERY

## 2018-07-10 PROCEDURE — 25000128 H RX IP 250 OP 636: Performed by: PHYSICIAN ASSISTANT

## 2018-07-10 PROCEDURE — 25000128 H RX IP 250 OP 636: Performed by: ANESTHESIOLOGY

## 2018-07-10 PROCEDURE — S2900 ROBOTIC SURGICAL SYSTEM: HCPCS | Mod: AS | Performed by: PHYSICIAN ASSISTANT

## 2018-07-10 PROCEDURE — 25000128 H RX IP 250 OP 636: Performed by: NURSE ANESTHETIST, CERTIFIED REGISTERED

## 2018-07-10 PROCEDURE — 27210794 ZZH OR GENERAL SUPPLY STERILE: Performed by: SURGERY

## 2018-07-10 PROCEDURE — 37000008 ZZH ANESTHESIA TECHNICAL FEE, 1ST 30 MIN: Performed by: SURGERY

## 2018-07-10 PROCEDURE — 36000085 ZZH SURGERY LEVEL 8 1ST 30 MIN: Performed by: SURGERY

## 2018-07-10 PROCEDURE — 25000125 ZZHC RX 250: Performed by: NURSE ANESTHETIST, CERTIFIED REGISTERED

## 2018-07-10 PROCEDURE — 25000125 ZZHC RX 250: Performed by: SURGERY

## 2018-07-10 PROCEDURE — 40000170 ZZH STATISTIC PRE-PROCEDURE ASSESSMENT II: Performed by: SURGERY

## 2018-07-10 PROCEDURE — 37000009 ZZH ANESTHESIA TECHNICAL FEE, EACH ADDTL 15 MIN: Performed by: SURGERY

## 2018-07-10 PROCEDURE — 36000087 ZZH SURGERY LEVEL 8 EA 15 ADDTL MIN: Performed by: SURGERY

## 2018-07-10 PROCEDURE — 25000128 H RX IP 250 OP 636: Performed by: SURGERY

## 2018-07-10 PROCEDURE — 49652 ZZHC LAP VENT/ABD HERNIA REPAIR: CPT | Performed by: SURGERY

## 2018-07-10 PROCEDURE — 25000125 ZZHC RX 250: Performed by: ANESTHESIOLOGY

## 2018-07-10 PROCEDURE — 25000132 ZZH RX MED GY IP 250 OP 250 PS 637: Performed by: SURGERY

## 2018-07-10 PROCEDURE — S2900 ROBOTIC SURGICAL SYSTEM: HCPCS | Performed by: SURGERY

## 2018-07-10 PROCEDURE — 71000013 ZZH RECOVERY PHASE 1 LEVEL 1 EA ADDTL HR: Performed by: SURGERY

## 2018-07-10 PROCEDURE — 40000935 ZZH STATISTIC OUTPATIENT (NON-OBS) EVE

## 2018-07-10 PROCEDURE — 27210995 ZZH RX 272: Performed by: SURGERY

## 2018-07-10 PROCEDURE — 71000012 ZZH RECOVERY PHASE 1 LEVEL 1 FIRST HR: Performed by: SURGERY

## 2018-07-10 PROCEDURE — 25000566 ZZH SEVOFLURANE, EA 15 MIN: Performed by: SURGERY

## 2018-07-10 DEVICE — MESH SYMBOTEX COMPOSITE STEX 25CM X 20CM SYM2520: Type: IMPLANTABLE DEVICE | Site: ABDOMEN | Status: FUNCTIONAL

## 2018-07-10 RX ORDER — SODIUM CHLORIDE, SODIUM LACTATE, POTASSIUM CHLORIDE, CALCIUM CHLORIDE 600; 310; 30; 20 MG/100ML; MG/100ML; MG/100ML; MG/100ML
INJECTION, SOLUTION INTRAVENOUS CONTINUOUS
Status: DISCONTINUED | OUTPATIENT
Start: 2018-07-10 | End: 2018-07-10 | Stop reason: HOSPADM

## 2018-07-10 RX ORDER — FLUTICASONE PROPIONATE 50 MCG
2 SPRAY, SUSPENSION (ML) NASAL EVERY MORNING
Status: DISCONTINUED | OUTPATIENT
Start: 2018-07-11 | End: 2018-07-11 | Stop reason: HOSPADM

## 2018-07-10 RX ORDER — ONDANSETRON 4 MG/1
4 TABLET, ORALLY DISINTEGRATING ORAL EVERY 30 MIN PRN
Status: DISCONTINUED | OUTPATIENT
Start: 2018-07-10 | End: 2018-07-10 | Stop reason: HOSPADM

## 2018-07-10 RX ORDER — HYDROMORPHONE HYDROCHLORIDE 1 MG/ML
.3-.5 INJECTION, SOLUTION INTRAMUSCULAR; INTRAVENOUS; SUBCUTANEOUS EVERY 10 MIN PRN
Status: DISCONTINUED | OUTPATIENT
Start: 2018-07-10 | End: 2018-07-10 | Stop reason: HOSPADM

## 2018-07-10 RX ORDER — ONDANSETRON 2 MG/ML
INJECTION INTRAMUSCULAR; INTRAVENOUS PRN
Status: DISCONTINUED | OUTPATIENT
Start: 2018-07-10 | End: 2018-07-10

## 2018-07-10 RX ORDER — FLUTICASONE PROPIONATE 50 MCG
2 SPRAY, SUSPENSION (ML) NASAL EVERY MORNING
COMMUNITY

## 2018-07-10 RX ORDER — PROPOFOL 10 MG/ML
INJECTION, EMULSION INTRAVENOUS CONTINUOUS PRN
Status: DISCONTINUED | OUTPATIENT
Start: 2018-07-10 | End: 2018-07-10

## 2018-07-10 RX ORDER — SODIUM CHLORIDE 9 MG/ML
INJECTION, SOLUTION INTRAVENOUS CONTINUOUS
Status: DISCONTINUED | OUTPATIENT
Start: 2018-07-10 | End: 2018-07-11 | Stop reason: HOSPADM

## 2018-07-10 RX ORDER — NALOXONE HYDROCHLORIDE 0.4 MG/ML
.1-.4 INJECTION, SOLUTION INTRAMUSCULAR; INTRAVENOUS; SUBCUTANEOUS
Status: DISCONTINUED | OUTPATIENT
Start: 2018-07-10 | End: 2018-07-11 | Stop reason: HOSPADM

## 2018-07-10 RX ORDER — LIDOCAINE HYDROCHLORIDE 20 MG/ML
INJECTION, SOLUTION INFILTRATION; PERINEURAL PRN
Status: DISCONTINUED | OUTPATIENT
Start: 2018-07-10 | End: 2018-07-10

## 2018-07-10 RX ORDER — BUPIVACAINE HYDROCHLORIDE AND EPINEPHRINE 5; 5 MG/ML; UG/ML
INJECTION, SOLUTION PERINEURAL PRN
Status: DISCONTINUED | OUTPATIENT
Start: 2018-07-10 | End: 2018-07-10 | Stop reason: HOSPADM

## 2018-07-10 RX ORDER — HYDROMORPHONE HYDROCHLORIDE 1 MG/ML
.3-.5 INJECTION, SOLUTION INTRAMUSCULAR; INTRAVENOUS; SUBCUTANEOUS
Status: DISCONTINUED | OUTPATIENT
Start: 2018-07-10 | End: 2018-07-11 | Stop reason: HOSPADM

## 2018-07-10 RX ORDER — LAMOTRIGINE 100 MG/1
200 TABLET ORAL EVERY MORNING
Status: DISCONTINUED | OUTPATIENT
Start: 2018-07-11 | End: 2018-07-11 | Stop reason: HOSPADM

## 2018-07-10 RX ORDER — MEPERIDINE HYDROCHLORIDE 25 MG/ML
12.5 INJECTION INTRAMUSCULAR; INTRAVENOUS; SUBCUTANEOUS
Status: DISCONTINUED | OUTPATIENT
Start: 2018-07-10 | End: 2018-07-10 | Stop reason: HOSPADM

## 2018-07-10 RX ORDER — FENTANYL CITRATE 50 UG/ML
INJECTION, SOLUTION INTRAMUSCULAR; INTRAVENOUS PRN
Status: DISCONTINUED | OUTPATIENT
Start: 2018-07-10 | End: 2018-07-10

## 2018-07-10 RX ORDER — PROPOFOL 10 MG/ML
INJECTION, EMULSION INTRAVENOUS PRN
Status: DISCONTINUED | OUTPATIENT
Start: 2018-07-10 | End: 2018-07-10

## 2018-07-10 RX ORDER — ONDANSETRON 2 MG/ML
4 INJECTION INTRAMUSCULAR; INTRAVENOUS EVERY 30 MIN PRN
Status: DISCONTINUED | OUTPATIENT
Start: 2018-07-10 | End: 2018-07-10 | Stop reason: HOSPADM

## 2018-07-10 RX ORDER — KETOROLAC TROMETHAMINE 30 MG/ML
30 INJECTION, SOLUTION INTRAMUSCULAR; INTRAVENOUS EVERY 6 HOURS
Status: COMPLETED | OUTPATIENT
Start: 2018-07-10 | End: 2018-07-11

## 2018-07-10 RX ORDER — ONDANSETRON 4 MG/1
4 TABLET, ORALLY DISINTEGRATING ORAL EVERY 6 HOURS PRN
Status: DISCONTINUED | OUTPATIENT
Start: 2018-07-10 | End: 2018-07-11 | Stop reason: HOSPADM

## 2018-07-10 RX ORDER — ONDANSETRON 2 MG/ML
4 INJECTION INTRAMUSCULAR; INTRAVENOUS EVERY 6 HOURS PRN
Status: DISCONTINUED | OUTPATIENT
Start: 2018-07-10 | End: 2018-07-11 | Stop reason: HOSPADM

## 2018-07-10 RX ORDER — VECURONIUM BROMIDE 1 MG/ML
INJECTION, POWDER, LYOPHILIZED, FOR SOLUTION INTRAVENOUS PRN
Status: DISCONTINUED | OUTPATIENT
Start: 2018-07-10 | End: 2018-07-10

## 2018-07-10 RX ORDER — FENTANYL CITRATE 50 UG/ML
25-50 INJECTION, SOLUTION INTRAMUSCULAR; INTRAVENOUS
Status: DISCONTINUED | OUTPATIENT
Start: 2018-07-10 | End: 2018-07-10 | Stop reason: HOSPADM

## 2018-07-10 RX ORDER — QUETIAPINE FUMARATE 25 MG/1
25-50 TABLET, FILM COATED ORAL AT BEDTIME
Status: DISCONTINUED | OUTPATIENT
Start: 2018-07-10 | End: 2018-07-11 | Stop reason: HOSPADM

## 2018-07-10 RX ORDER — PROCHLORPERAZINE MALEATE 5 MG
10 TABLET ORAL EVERY 6 HOURS PRN
Status: DISCONTINUED | OUTPATIENT
Start: 2018-07-10 | End: 2018-07-11 | Stop reason: HOSPADM

## 2018-07-10 RX ORDER — GLYCOPYRROLATE 0.2 MG/ML
INJECTION, SOLUTION INTRAMUSCULAR; INTRAVENOUS PRN
Status: DISCONTINUED | OUTPATIENT
Start: 2018-07-10 | End: 2018-07-10

## 2018-07-10 RX ORDER — OXYCODONE HYDROCHLORIDE 5 MG/1
5-10 TABLET ORAL EVERY 4 HOURS PRN
Status: DISCONTINUED | OUTPATIENT
Start: 2018-07-10 | End: 2018-07-11 | Stop reason: HOSPADM

## 2018-07-10 RX ORDER — NALOXONE HYDROCHLORIDE 0.4 MG/ML
.1-.4 INJECTION, SOLUTION INTRAMUSCULAR; INTRAVENOUS; SUBCUTANEOUS
Status: DISCONTINUED | OUTPATIENT
Start: 2018-07-10 | End: 2018-07-10 | Stop reason: HOSPADM

## 2018-07-10 RX ORDER — HYDROCODONE BITARTRATE AND ACETAMINOPHEN 5; 325 MG/1; MG/1
1-2 TABLET ORAL EVERY 4 HOURS PRN
Status: DISCONTINUED | OUTPATIENT
Start: 2018-07-10 | End: 2018-07-11 | Stop reason: HOSPADM

## 2018-07-10 RX ORDER — DEXAMETHASONE SODIUM PHOSPHATE 4 MG/ML
INJECTION, SOLUTION INTRA-ARTICULAR; INTRALESIONAL; INTRAMUSCULAR; INTRAVENOUS; SOFT TISSUE PRN
Status: DISCONTINUED | OUTPATIENT
Start: 2018-07-10 | End: 2018-07-10

## 2018-07-10 RX ORDER — LIDOCAINE 40 MG/G
CREAM TOPICAL
Status: DISCONTINUED | OUTPATIENT
Start: 2018-07-10 | End: 2018-07-11 | Stop reason: HOSPADM

## 2018-07-10 RX ORDER — HYDROMORPHONE HYDROCHLORIDE 1 MG/ML
.3-.5 INJECTION, SOLUTION INTRAMUSCULAR; INTRAVENOUS; SUBCUTANEOUS EVERY 5 MIN PRN
Status: DISCONTINUED | OUTPATIENT
Start: 2018-07-10 | End: 2018-07-10 | Stop reason: HOSPADM

## 2018-07-10 RX ORDER — MAGNESIUM HYDROXIDE 1200 MG/15ML
LIQUID ORAL PRN
Status: DISCONTINUED | OUTPATIENT
Start: 2018-07-10 | End: 2018-07-10 | Stop reason: HOSPADM

## 2018-07-10 RX ORDER — CEFAZOLIN SODIUM 1 G/3ML
1 INJECTION, POWDER, FOR SOLUTION INTRAMUSCULAR; INTRAVENOUS SEE ADMIN INSTRUCTIONS
Status: DISCONTINUED | OUTPATIENT
Start: 2018-07-10 | End: 2018-07-10 | Stop reason: HOSPADM

## 2018-07-10 RX ORDER — CEFAZOLIN SODIUM 2 G/100ML
2 INJECTION, SOLUTION INTRAVENOUS
Status: COMPLETED | OUTPATIENT
Start: 2018-07-10 | End: 2018-07-10

## 2018-07-10 RX ORDER — NALOXONE HYDROCHLORIDE 0.4 MG/ML
.1-.4 INJECTION, SOLUTION INTRAMUSCULAR; INTRAVENOUS; SUBCUTANEOUS
Status: DISCONTINUED | OUTPATIENT
Start: 2018-07-10 | End: 2018-07-10

## 2018-07-10 RX ORDER — NEOSTIGMINE METHYLSULFATE 1 MG/ML
VIAL (ML) INJECTION PRN
Status: DISCONTINUED | OUTPATIENT
Start: 2018-07-10 | End: 2018-07-10

## 2018-07-10 RX ADMIN — HYDROMORPHONE HYDROCHLORIDE 0.3 MG: 1 INJECTION, SOLUTION INTRAMUSCULAR; INTRAVENOUS; SUBCUTANEOUS at 13:55

## 2018-07-10 RX ADMIN — OXYCODONE HYDROCHLORIDE 5 MG: 5 TABLET ORAL at 18:02

## 2018-07-10 RX ADMIN — VECURONIUM BROMIDE 1 MG: 1 INJECTION, POWDER, LYOPHILIZED, FOR SOLUTION INTRAVENOUS at 13:13

## 2018-07-10 RX ADMIN — Medication 0.5 MG: at 16:19

## 2018-07-10 RX ADMIN — PROPOFOL 50 MCG/KG/MIN: 10 INJECTION, EMULSION INTRAVENOUS at 11:28

## 2018-07-10 RX ADMIN — LIDOCAINE HYDROCHLORIDE 2 ML: 10 INJECTION, SOLUTION EPIDURAL; INFILTRATION; INTRACAUDAL; PERINEURAL at 11:20

## 2018-07-10 RX ADMIN — MEPERIDINE HYDROCHLORIDE 12.5 MG: 25 INJECTION INTRAMUSCULAR; INTRAVENOUS; SUBCUTANEOUS at 15:01

## 2018-07-10 RX ADMIN — MIDAZOLAM 2 MG: 1 INJECTION INTRAMUSCULAR; INTRAVENOUS at 11:22

## 2018-07-10 RX ADMIN — KETOROLAC TROMETHAMINE 30 MG: 30 INJECTION, SOLUTION INTRAMUSCULAR at 21:51

## 2018-07-10 RX ADMIN — KETOROLAC TROMETHAMINE 30 MG: 30 INJECTION, SOLUTION INTRAMUSCULAR at 14:31

## 2018-07-10 RX ADMIN — SODIUM CHLORIDE, POTASSIUM CHLORIDE, SODIUM LACTATE AND CALCIUM CHLORIDE: 600; 310; 30; 20 INJECTION, SOLUTION INTRAVENOUS at 12:32

## 2018-07-10 RX ADMIN — Medication 0.5 MG: at 15:58

## 2018-07-10 RX ADMIN — VECURONIUM BROMIDE 1 MG: 1 INJECTION, POWDER, LYOPHILIZED, FOR SOLUTION INTRAVENOUS at 13:28

## 2018-07-10 RX ADMIN — SODIUM CHLORIDE, PRESERVATIVE FREE: 5 INJECTION INTRAVENOUS at 17:20

## 2018-07-10 RX ADMIN — Medication 0.5 MG: at 14:28

## 2018-07-10 RX ADMIN — NEOSTIGMINE METHYLSULFATE 3.5 MG: 1 INJECTION, SOLUTION INTRAVENOUS at 13:51

## 2018-07-10 RX ADMIN — OXYCODONE HYDROCHLORIDE 5 MG: 5 TABLET ORAL at 18:45

## 2018-07-10 RX ADMIN — DEXMEDETOMIDINE HYDROCHLORIDE 4 MCG: 100 INJECTION, SOLUTION INTRAVENOUS at 14:07

## 2018-07-10 RX ADMIN — FENTANYL CITRATE 50 MCG: 50 INJECTION INTRAMUSCULAR; INTRAVENOUS at 15:56

## 2018-07-10 RX ADMIN — VECURONIUM BROMIDE 1 MG: 1 INJECTION, POWDER, LYOPHILIZED, FOR SOLUTION INTRAVENOUS at 12:29

## 2018-07-10 RX ADMIN — CEFAZOLIN SODIUM 2 G: 2 INJECTION, SOLUTION INTRAVENOUS at 11:36

## 2018-07-10 RX ADMIN — VECURONIUM BROMIDE 1 MG: 1 INJECTION, POWDER, LYOPHILIZED, FOR SOLUTION INTRAVENOUS at 12:52

## 2018-07-10 RX ADMIN — ROCURONIUM BROMIDE 50 MG: 10 INJECTION INTRAVENOUS at 11:30

## 2018-07-10 RX ADMIN — SODIUM CHLORIDE 1000 ML: 900 IRRIGANT IRRIGATION at 11:54

## 2018-07-10 RX ADMIN — GLYCOPYRROLATE 0.6 MG: 0.2 INJECTION, SOLUTION INTRAMUSCULAR; INTRAVENOUS at 13:51

## 2018-07-10 RX ADMIN — DEXMEDETOMIDINE HYDROCHLORIDE 4 MCG: 100 INJECTION, SOLUTION INTRAVENOUS at 14:23

## 2018-07-10 RX ADMIN — OXYCODONE HYDROCHLORIDE 5 MG: 5 TABLET ORAL at 23:55

## 2018-07-10 RX ADMIN — FENTANYL CITRATE 50 MCG: 50 INJECTION INTRAMUSCULAR; INTRAVENOUS at 14:38

## 2018-07-10 RX ADMIN — LIDOCAINE HYDROCHLORIDE 80 MG: 20 INJECTION, SOLUTION INFILTRATION; PERINEURAL at 11:28

## 2018-07-10 RX ADMIN — CEFAZOLIN SODIUM 1 G: 2 INJECTION, SOLUTION INTRAVENOUS at 13:36

## 2018-07-10 RX ADMIN — ONDANSETRON 4 MG: 2 INJECTION INTRAMUSCULAR; INTRAVENOUS at 13:40

## 2018-07-10 RX ADMIN — FENTANYL CITRATE 50 MCG: 50 INJECTION INTRAMUSCULAR; INTRAVENOUS at 14:45

## 2018-07-10 RX ADMIN — PROPOFOL 200 MG: 10 INJECTION, EMULSION INTRAVENOUS at 11:28

## 2018-07-10 RX ADMIN — DEXMEDETOMIDINE HYDROCHLORIDE 12 MCG: 100 INJECTION, SOLUTION INTRAVENOUS at 11:51

## 2018-07-10 RX ADMIN — Medication 0.5 MG: at 14:41

## 2018-07-10 RX ADMIN — FENTANYL CITRATE 50 MCG: 50 INJECTION INTRAMUSCULAR; INTRAVENOUS at 14:29

## 2018-07-10 RX ADMIN — FENTANYL CITRATE 100 MCG: 50 INJECTION, SOLUTION INTRAMUSCULAR; INTRAVENOUS at 11:28

## 2018-07-10 RX ADMIN — DEXAMETHASONE SODIUM PHOSPHATE 4 MG: 4 INJECTION, SOLUTION INTRA-ARTICULAR; INTRALESIONAL; INTRAMUSCULAR; INTRAVENOUS; SOFT TISSUE at 11:40

## 2018-07-10 RX ADMIN — BUPIVACAINE HYDROCHLORIDE AND EPINEPHRINE BITARTRATE 30 ML: 5; .005 INJECTION, SOLUTION PERINEURAL at 13:50

## 2018-07-10 RX ADMIN — SODIUM CHLORIDE, POTASSIUM CHLORIDE, SODIUM LACTATE AND CALCIUM CHLORIDE: 600; 310; 30; 20 INJECTION, SOLUTION INTRAVENOUS at 11:18

## 2018-07-10 ASSESSMENT — LIFESTYLE VARIABLES: TOBACCO_USE: 0

## 2018-07-10 NOTE — PROGRESS NOTES
Admission medication history interview status for the 7/10/2018  admission is complete. See EPIC admission navigator for prior to admission medications     Medication history source reliability:Good    Medication history interview source(s):Patient    Medication history resources (including written lists, pill bottles, clinic record):Patient emailed in her medication list prior to surgery    Primary pharmacy[facility]    Additional medication history information not noted on PTA med list :None    Time spent in this activity: 40 minutes    Prior to Admission medications    Medication Sig Last Dose Taking? Auth Provider   Cyanocobalamin (VITAMIN B-12 PO) Take 5,000 mcg by mouth every morning 7/9/2018 at AM Yes Reported, Patient   fluticasone (FLONASE) 50 MCG/ACT spray Spray 2 sprays into both nostrils every morning 7/9/2018 at AM Yes Reported, Patient   LamoTRIgine (LAMICTAL PO) Take 200 mg by mouth every morning  7/10/2018 at 0800 Yes Reported, Patient   LORATADINE PO Take 10 mg by mouth every morning 7/9/2018 at AM Yes Reported, Patient   LORazepam (ATIVAN PO) Take 0.5-1 mg by mouth daily as needed for anxiety 7/3/2018 at AM Yes Reported, Patient   Multiple Vitamins-Minerals (CENTRUM) CHEW Take 2 chew tab by mouth every morning 7/9/2018 at AM Yes Reported, Patient   QUEtiapine Fumarate (SEROQUEL PO) Take 25-50 mg by mouth nightly as needed  Past Month at HS Yes Reported, Patient   VITAMIN D, CHOLECALCIFEROL, PO Take 2,000 Units by mouth daily 7/9/2018 at AM Yes Reported, Patient

## 2018-07-10 NOTE — ANESTHESIA POSTPROCEDURE EVALUATION
Patient: Chelle Rocha    Procedure(s):  ROBOTIC ASSISTED VENTRAL HERNIA REPAIR WITH MESH  - Wound Class: I-Clean    Diagnosis:ventral hernia   Diagnosis Additional Information: No value filed.    Anesthesia Type:  General, ETT    Note:  Anesthesia Post Evaluation    Patient location during evaluation: PACU  Patient participation: Able to fully participate in evaluation  Level of consciousness: awake and alert  Pain management: satisfactory to patient  Airway patency: patent  Cardiovascular status: hemodynamically stable  Respiratory status: acceptable and unassisted  Hydration status: balanced  PONV: none     Anesthetic complications: None          Last vitals:  Vitals:    07/10/18 1615 07/10/18 1634 07/10/18 1709   BP: 121/79  130/79   Resp: 22 20 20   Temp:   37.2  C (98.9  F)   SpO2: 95%  95%         Electronically Signed By: Filiberto Layne MD  July 10, 2018  5:32 PM

## 2018-07-10 NOTE — IP AVS SNAPSHOT
MRN:[id number]                      After Visit Summary   7/10/2018    Chelle Rocha    MRN: [id number]           Thank you!     Thank you for choosing [facility] for your care. Our goal is always to provide you with excellent care. Hearing back from our patients is one way we can continue to improve our services. Please take a few minutes to complete the written survey that you may receive in the mail after you visit with us. Thank you!        Patient Information     Date Of Birth          1985        Designated Caregiver       Most Recent Value    Caregiver    Will someone help with your care after discharge? yes    Name of designated caregiver kae    Phone number of caregiver     Caregiver address [place]      About your hospital stay     You were admitted on:  July 10, 2018 [facility]    You were discharged on:  July 11, 2018        Reason for your hospital stay       Hernia surgery                  Who to Call     For medical emergencies, please call 911.  For non-urgent questions about your medical care, please call your primary care provider or clinic, [phone]  For questions related to your surgery, please call your surgery clinic        Attending Provider     Provider Specialty    Wyatt Golden MD General Surgery       Primary Care Provider Office Phone # Fax #    Jessica Alvarez [phone]      After Care Instructions     Activity       Your activity upon discharge: activity as tolerated, ambulate in house, no driving while on analgesics and no heavy lifting for 2 weeks            Diet       Follow this diet upon discharge: Advance to a regular diet as tolerated            Wound care and dressings       Instructions to care for your wound at home: may get incision wet in shower but do not soak or scrub.                  Follow-up Appointments     Follow-up and recommended labs and tests        Our office will contact you within 2 weeks to check on your progress and answer any questions you may have.  If you are doing well, you will not need to return for a follow up appointment.  If any concerns are identified over the phone, we may ask you to make an appointment to see a provider in our clinic.   If you have not received a phone call, have any questions or concerns, or would like to be seen, please call us at [phone] and ask to speak with our nurse.  We are located at [address] [place] [address].                  Further instructions from your care team       [facility]  Discharge Instructions: Post-Operative Laparoscopic Inguinal Hernia    ACTIVITY    Take frequent, short walks and increase your activity gradually.      Avoid strenuous physical activity or heavy lifting greater than 15 lbs. for 3-4 weeks.  You may climb stairs.    You may drive without restrictions when you are not using any prescription pain medication and feel comfortable in a car.    You may return to work/school when you are comfortable without any prescription pain medication.    WOUND CARE    You may remove your outer dressing or Band-Aids and shower 48 hours after the surgery.  Pat your incisions dry and leave them open to air.  Re-apply dressing (Band-Aids or gauze/tape) as needed for comfort or drainage.    You may have steri-strips (looks like white tape) on your incision.  You may peel off the steri-strips 2 weeks after your surgery if they have not peeled off on their own.     Do not soak your incisions in a tub or pool for 2 weeks.     Do not apply any lotions, creams, or ointments to your incisions.    A ridge under your incisions is normal and will gradually resolve.    DIET    Start with liquids, then gradually resume your regular diet as tolerated.     Drink plenty of fluids to stay hydrated.    PAIN    Expect some tenderness and discomfort at the  incision sites.  Use the prescribed pain medication at your discretion.  Expect gradual resolution of your pain over several days.    You may take tylenol and ibuprofen with food (unless you have been told not to) instead of or in addition to your prescribed pain medication.  If you are taking Norco or Percocet, do not take any additional acetaminophen/APAP/Tylenol.    Do not drink alcohol or drive while you are taking pain medications.    You may apply ice to your incisions in 20 minute intervals as needed for the next 48 hours.  After that time, consider switching to heat if you prefer.    Wear the abdominal binder for a few days for comfort and support.    EXPECTATIONS    Pain medications can cause constipation.  Limit use when possible.  Take over the counter stool softener/stimulant, such as Colace or Senna, 1-2 times a day with plenty of water.  You may take a mild over the counter laxative, such as Miralax or a suppository, as needed.  You may take 1 oz. (2 tablespoons) Milk of Magnesia the evening following surgery to encourage bowel movement.  You may discontinue these medications once you are having regular bowel movements and/or are no longer taking your narcotic pain medication.     You may have shoulder or upper back discomfort due to the gas used in surgery.  This is temporary and should resolve in 48-72 hours.  Short, frequent walks may help with this.    FOLLOW UP    Our office will contact you approximately 2 weeks to check on your progress and answer any questions you may have.  If you are doing well, you will not need to return for a follow up appointment.  If any concerns are identified over the phone, we will help you make an appointment to see a provider.     If you have not received a phone call, have any questions or concerns, or would like to be seen, please call us at [phone] and ask to speak with our nurse.  We are located at [address] [place] [address]  "[id number].    CALL OUR OFFICE [phone] IF YOU HAVE:     Chills or fever above 101 F.    Increased redness, warmth, or drainage at your incisions.    Significant bleeding.    Pain not relieved by your pain medication or rest.    Increasing pain after the first 48 hours.    Any other concerns or questions.    Revised January 2018        Pending Results     No orders found for last 3 day(s).            Statement of Approval     Ordered          07/11/18 9925  I have reviewed and agree with all the recommendations and orders detailed in this document.  EFFECTIVE NOW     Approved and electronically signed by:  Darío De Oliveira PA-C             Admission Information     Date & Time Provider Department Dept. Phone    7/10/2018 Wyatt Golden MD [facility] [phone]      Your Vitals Were     Blood Pressure Temperature Respirations Height Weight Last Period    120/71 (BP Location: Right arm) 98.3  F (36.8  C) (Oral) 16 1.549 m (5' 1\") 67.3 kg (148 lb 6 oz) 04/10/2018    Pulse Oximetry BMI (Body Mass Index)                97% 28.04 kg/m2          [employer] Information     [employer] gives you secure access to your electronic health record. If you see a primary care provider, you can also send messages to your care team and make appointments. If you have questions, please call your primary care clinic.  If you do not have a primary care provider, please call [phone] and they will assist you.        Care EveryWhere ID     This is your Care EveryWhere ID. This could be used by other organizations to access your [facility] medical records  [id number]        Equal Access to Services     [facility] AH: Hadii aad ku hadasho Somaryali, waaxda luqadaha, qaybta kaalmada adeegyada, anh mullins. So [facility] [phone].    ATENCIÓN: Si habla español, tiene a avitia disposición servicios gratuitos de asistencia lingüística. Llame al [phone].    We comply with applicable " federal civil rights laws and [place] laws. We do not discriminate on the basis of race, color, national origin, age, disability, sex, sexual orientation, or gender identity.               Review of your medicines      START taking        Dose / Directions    oxyCODONE IR 5 MG tablet   Commonly known as:  ROXICODONE   Used for:  Post-op pain        Dose:  5-10 mg   Take 1-2 tablets (5-10 mg) by mouth every 4 hours as needed for moderate to severe pain   Quantity:  25 tablet   Refills:  0         CONTINUE these medicines which have NOT CHANGED        Dose / Directions    ATIVAN PO        Dose:  0.5-1 mg   Take 0.5-1 mg by mouth daily as needed for anxiety   Refills:  0       CENTRUM Chew        Dose:  2 chew tab   Take 2 chew tab by mouth every morning   Refills:  0       fluticasone 50 MCG/ACT spray   Commonly known as:  FLONASE        Dose:  2 spray   Spray 2 sprays into both nostrils every morning   Refills:  0       LAMICTAL PO        Dose:  200 mg   Take 200 mg by mouth every morning   Refills:  0       LORATADINE PO        Dose:  10 mg   Take 10 mg by mouth every morning   Refills:  0       SEROQUEL PO        Dose:  25-50 mg   Take 25-50 mg by mouth nightly as needed   Refills:  0       VITAMIN B-12 PO        Dose:  5000 mcg   Take 5,000 mcg by mouth every morning   Refills:  0       VITAMIN D (CHOLECALCIFEROL) PO        Dose:  2000 Units   Take 2,000 Units by mouth daily   Refills:  0            Where to get your medicines      Some of these will need a paper prescription and others can be bought over the counter. Ask your nurse if you have questions.     Bring a paper prescription for each of these medications     oxyCODONE IR 5 MG tablet                Protect others around you: Learn how to safely use, store and throw away your medicines at www.disposemymeds.org.        Information about OPIOIDS     PRESCRIPTION OPIOIDS: WHAT YOU NEED TO KNOW   We gave you an opioid (narcotic) pain medicine. It is  important to manage your pain, but opioids are not always the best choice. You should first try all the other options your care team gave you. Take this medicine for as short a time (and as few doses) as possible.     These medicines have risks:    DO NOT drive when on new or higher doses of pain medicine. These medicines can affect your alertness and reaction times, and you could be arrested for driving under the influence (DUI). If you need to use opioids long-term, talk to your care team about driving.    DO NOT operate heave machinery    DO NOT do any other dangerous activities while taking these medicines.     DO NOT drink any alcohol while taking these medicines.      If the opioid prescribed includes acetaminophen, DO NOT take with any other medicines that contain acetaminophen. Read all labels carefully. Look for the word  acetaminophen  or  Tylenol.  Ask your pharmacist if you have questions or are unsure.    You can get addicted to pain medicines, especially if you have a history of addiction (chemical, alcohol or substance dependence). Talk to your care team about ways to reduce this risk.    Store your pills in a secure place, locked if possible. We will not replace any lost or stolen medicine. If you don t finish your medicine, please throw away (dispose) as directed by your pharmacist. The [place] Pollution Control Agency has more information about safe disposal: https://www.pca.[facility].[place].us/living-green/managing-unwanted-medications.     All opioids tend to cause constipation. Drink plenty of water and eat foods that have a lot of fiber, such as fruits, vegetables, prune juice, apple juice and high-fiber cereal. Take a laxative (Miralax, milk of magnesia, Colace, Senna) if you don t move your bowels at least every other day.              Medication List: This is a list of all your medications and when to take them. Check marks below indicate your daily home schedule. Keep this list as a reference.       Medications           Morning Afternoon Evening Bedtime As Needed    ATIVAN PO   Take 0.5-1 mg by mouth daily as needed for anxiety                                   CENTRUM Chew   Take 2 chew tab by mouth every morning                                   fluticasone 50 MCG/ACT spray   Commonly known as:  FLONASE   Spray 2 sprays into both nostrils every morning   Last time this was given:  2 sprays on 7/11/2018  7:56 AM                                   LAMICTAL PO   Take 200 mg by mouth every morning   Last time this was given:  200 mg on 7/11/2018  7:55 AM                                   LORATADINE PO   Take 10 mg by mouth every morning                                   oxyCODONE IR 5 MG tablet   Commonly known as:  ROXICODONE   Take 1-2 tablets (5-10 mg) by mouth every 4 hours as needed for moderate to severe pain   Last time this was given:  5 mg on 7/11/2018  9:50 AM                                   SEROQUEL PO   Take 25-50 mg by mouth nightly as needed                                   VITAMIN B-12 PO   Take 5,000 mcg by mouth every morning                                   VITAMIN D (CHOLECALCIFEROL) PO   Take 2,000 Units by mouth daily

## 2018-07-10 NOTE — ANESTHESIA CARE TRANSFER NOTE
Patient: Chelle Rocha    Procedure(s):  ROBOTIC ASSISTED VENTRAL HERNIA REPAIR WITH MESH  - Wound Class: I-Clean    Diagnosis: ventral hernia   Diagnosis Additional Information: No value filed.    Anesthesia Type:   General, ETT     Note:  Airway :Face Mask  Patient transferred to:PACU  Handoff Report: Identifed the Patient, Identified the Reponsible Provider, Reviewed the pertinent medical history, Discussed the surgical course, Reviewed Intra-OP anesthesia mangement and issues during anesthesia, Set expectations for post-procedure period and Allowed opportunity for questions and acknowledgement of understanding      Vitals: (Last set prior to Anesthesia Care Transfer)    CRNA VITALS  7/10/2018 1353 - 7/10/2018 1423      7/10/2018             Resp Rate (set): 10                Electronically Signed By: LAURA Cloud CRNA  July 10, 2018  2:23 PM
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
## 2018-07-10 NOTE — DISCHARGE INSTRUCTIONS
[facility]  Discharge Instructions: Post-Operative Laparoscopic Inguinal Hernia    ACTIVITY    Take frequent, short walks and increase your activity gradually.      Avoid strenuous physical activity or heavy lifting greater than 15 lbs. for 3-4 weeks.  You may climb stairs.    You may drive without restrictions when you are not using any prescription pain medication and feel comfortable in a car.    You may return to work/school when you are comfortable without any prescription pain medication.    WOUND CARE    You may remove your outer dressing or Band-Aids and shower 48 hours after the surgery.  Pat your incisions dry and leave them open to air.  Re-apply dressing (Band-Aids or gauze/tape) as needed for comfort or drainage.    You may have steri-strips (looks like white tape) on your incision.  You may peel off the steri-strips 2 weeks after your surgery if they have not peeled off on their own.     Do not soak your incisions in a tub or pool for 2 weeks.     Do not apply any lotions, creams, or ointments to your incisions.    A ridge under your incisions is normal and will gradually resolve.    DIET    Start with liquids, then gradually resume your regular diet as tolerated.     Drink plenty of fluids to stay hydrated.    PAIN    Expect some tenderness and discomfort at the incision sites.  Use the prescribed pain medication at your discretion.  Expect gradual resolution of your pain over several days.    You may take tylenol and ibuprofen with food (unless you have been told not to) instead of or in addition to your prescribed pain medication.  If you are taking Norco or Percocet, do not take any additional acetaminophen/APAP/Tylenol.    Do not drink alcohol or drive while you are taking pain medications.    You may apply ice to your incisions in 20 minute intervals as needed for the next 48 hours.  After that time, consider switching to heat if you prefer.    Wear the  abdominal binder for a few days for comfort and support.    EXPECTATIONS    Pain medications can cause constipation.  Limit use when possible.  Take over the counter stool softener/stimulant, such as Colace or Senna, 1-2 times a day with plenty of water.  You may take a mild over the counter laxative, such as Miralax or a suppository, as needed.  You may take 1 oz. (2 tablespoons) Milk of Magnesia the evening following surgery to encourage bowel movement.  You may discontinue these medications once you are having regular bowel movements and/or are no longer taking your narcotic pain medication.     You may have shoulder or upper back discomfort due to the gas used in surgery.  This is temporary and should resolve in 48-72 hours.  Short, frequent walks may help with this.    FOLLOW UP    Our office will contact you approximately 2 weeks to check on your progress and answer any questions you may have.  If you are doing well, you will not need to return for a follow up appointment.  If any concerns are identified over the phone, we will help you make an appointment to see a provider.     If you have not received a phone call, have any questions or concerns, or would like to be seen, please call us at [phone] and ask to speak with our nurse.  We are located at [address] [place] [address].    CALL OUR OFFICE -[phone] IF YOU HAVE:     Chills or fever above 101 F.    Increased redness, warmth, or drainage at your incisions.    Significant bleeding.    Pain not relieved by your pain medication or rest.    Increasing pain after the first 48 hours.    Any other concerns or questions.    Revised January 2018

## 2018-07-10 NOTE — OP NOTE
PREOPERATIVE DIAGNOSIS: Ventral/incisional hernia with diastases recti  POSTOPERATIVE DIAGNOSIS: Same  PROCEDURE: Da Alvaro assisted laparoscopic repair of  ventral hernia and rectus plication  Surgeon: Wyatt Golden MD   1st Assistant: Derrick Joya PA-C, Physician assistant first assistant was necessary during the performance of this procedure for expertise in patient positioning, prepping, draping, trocar placement, camera management, retraction and exposure, and suctioning.  ANESTHESIA: General endotracheal.   ESTIMATED BLOOD LOSS: 10cc   DRAINS: None.   COMPLICATIONS: None.   SPECIMENS: None.   INDICATIONS: Chelle Rocha  presented to my office with a symptomatic  ventral hernia.  She also had a modest rectus diastases.  I discussed with her the therapeutic options and it was elected to proceed with da Lavaro assisted laparoscopic repair. The potential risks of bleeding, infection, bowel injury, recurrent hernia, chronic prosthetic infection or chronic pain were all reviewed in detail and she  wished to proceed.   DESCRIPTION OF PROCEDURE: After informed consent was obtained, the patient was taken to the operating room and placed supine on the operating table. Following the induction of adequate general endotracheal anesthesia, the abdomen was shaved, prepped and draped in the usual fashion. A surgical timeout was initiated and completed, appropriate IV antibiotics were administered.  A stab incision was made in the left upper quadrant and a 5 mm optical trocar was used to gain access to the peritoneal cavity. A carbon dioxide pneumoperitoneum was then established. Under direct vision three 8 mm da Alvaro ports were placed along the right abdomen. The anterior abdominal wall was then surveyed.  There were no significant intra-abdominal adhesions from her prior surgery.  There was an obvious hernia defect in the area of the prior incision above the umbilicus.  There is also modest rectus diastases up to the  level of the xiphoid process. The falciform ligament was then taken down for repair.  The rectus abdominis was then plicated and the hernia defect itself was closed using running #1 strata fix suture.  I elected to reinforce the repair with a 25x12 cm Symbotex mesh. A positioning suture was placed in the center of the mesh. The mesh was then introduced into the abdominal cavity. The positioning suture was brought out through the center of the fascial closure and plication and used to suspend the mesh to the anterior abdominal wall. With the mesh in good position, the mesh was sutured to the anterior abdominal wall around its periphery using #1 strata fix suture.  The central portion of the mesh was secured to the abdominal wall using an absorbable tacking instrument.  Once the mesh was completely tacked into position, all trocars were removed. Carbon dioxide was massaged from the abdomen. Local anesthetic was injected and the skin incisions were closed with subcuticular 4-0 Vicryl stitches. Benzoin and Steri-Strips were applied. Needle and sponge counts were correct. The patient tolerated this well. He was awakened in the operating room, extubated and taken to recovery room in stable condition.   EDER CALLEJAS MD

## 2018-07-10 NOTE — BRIEF OP NOTE
[facility] Brief Operative Note    Pre-operative diagnosis: Ventral hernia  Rectus diastasis   Post-operative diagnosis same   Procedure: Procedure(s):  ROBOTIC ASSISTED VENTRAL HERNIA REPAIR WITH MESH  - Wound Class: I-Clean   Surgeon(s): Surgeon(s) and Role:     * Wyatt Golden MD - Primary     * Derrick Joya PA-C - Assisting   Estimated blood loss: 10 mL    Specimens: * No specimens in log *   Findings: [id number] symbotex mesh  stratafix PDS perimeter fixation with relila-tack field fixation.  No complications    Derrick Joya PA-C  Office: [phone]  Pager: [phone]

## 2018-07-10 NOTE — IP AVS SNAPSHOT
[facility] Observation Unit    [address] [place] [address]    Phone:  [phone]                                       After Visit Summary   7/10/2018    Chelle Rocha    MRN: [id number]           After Visit Summary Signature Page     I have received my discharge instructions, and my questions have been answered. I have discussed any challenges I see with this plan with the nurse or doctor.    ..........................................................................................................................................  Patient/Patient Representative Signature      ..........................................................................................................................................  Patient Representative Print Name and Relationship to Patient    ..................................................               ................................................  Date                                            Time    ..........................................................................................................................................  Reviewed by Signature/Title    ...................................................              ..............................................  Date                                                            Time

## 2018-07-11 VITALS
TEMPERATURE: 98.3 F | RESPIRATION RATE: 16 BRPM | HEIGHT: 61 IN | DIASTOLIC BLOOD PRESSURE: 71 MMHG | OXYGEN SATURATION: 97 % | WEIGHT: 148.38 LBS | BODY MASS INDEX: 28.01 KG/M2 | SYSTOLIC BLOOD PRESSURE: 120 MMHG

## 2018-07-11 PROCEDURE — 25000132 ZZH RX MED GY IP 250 OP 250 PS 637: Performed by: PHYSICIAN ASSISTANT

## 2018-07-11 PROCEDURE — 40000934 ZZH STATISTIC OUTPATIENT (NON-OBS) DAY

## 2018-07-11 PROCEDURE — 25000132 ZZH RX MED GY IP 250 OP 250 PS 637: Performed by: SURGERY

## 2018-07-11 PROCEDURE — 25000128 H RX IP 250 OP 636: Performed by: PHYSICIAN ASSISTANT

## 2018-07-11 RX ORDER — HYDROCODONE BITARTRATE AND ACETAMINOPHEN 5; 325 MG/1; MG/1
1-2 TABLET ORAL EVERY 4 HOURS PRN
Qty: 25 TABLET | Refills: 0 | Status: SHIPPED | OUTPATIENT
Start: 2018-07-11 | End: 2018-07-11

## 2018-07-11 RX ORDER — OXYCODONE HYDROCHLORIDE 5 MG/1
5-10 TABLET ORAL EVERY 4 HOURS PRN
Qty: 25 TABLET | Refills: 0 | Status: SHIPPED | OUTPATIENT
Start: 2018-07-11 | End: 2018-07-13

## 2018-07-11 RX ORDER — LORAZEPAM 0.5 MG/1
0.5 TABLET ORAL 2 TIMES DAILY PRN
Status: DISCONTINUED | OUTPATIENT
Start: 2018-07-11 | End: 2018-07-11 | Stop reason: HOSPADM

## 2018-07-11 RX ORDER — POLYETHYLENE GLYCOL 3350 17 G/17G
17 POWDER, FOR SOLUTION ORAL DAILY
Status: DISCONTINUED | OUTPATIENT
Start: 2018-07-11 | End: 2018-07-11 | Stop reason: HOSPADM

## 2018-07-11 RX ADMIN — POLYETHYLENE GLYCOL 3350 17 G: 17 POWDER, FOR SOLUTION ORAL at 10:16

## 2018-07-11 RX ADMIN — OXYCODONE HYDROCHLORIDE 5 MG: 5 TABLET ORAL at 09:50

## 2018-07-11 RX ADMIN — KETOROLAC TROMETHAMINE 30 MG: 30 INJECTION, SOLUTION INTRAMUSCULAR at 07:55

## 2018-07-11 RX ADMIN — OXYCODONE HYDROCHLORIDE 10 MG: 5 TABLET ORAL at 05:39

## 2018-07-11 RX ADMIN — LAMOTRIGINE 200 MG: 100 TABLET ORAL at 07:55

## 2018-07-11 RX ADMIN — KETOROLAC TROMETHAMINE 30 MG: 30 INJECTION, SOLUTION INTRAMUSCULAR at 02:37

## 2018-07-11 RX ADMIN — SODIUM CHLORIDE, PRESERVATIVE FREE: 5 INJECTION INTRAVENOUS at 02:37

## 2018-07-11 RX ADMIN — Medication 0.5 MG: at 01:04

## 2018-07-11 RX ADMIN — FLUTICASONE PROPIONATE 2 SPRAY: 50 SPRAY, METERED NASAL at 07:56

## 2018-07-11 RX ADMIN — OXYCODONE HYDROCHLORIDE 10 MG: 5 TABLET ORAL at 13:47

## 2018-07-11 NOTE — PROGRESS NOTES
"Surgery    Pain with movement as expected, but controlled with oxycodone.   Unable to void completely- straight cath x 2 last night, however she just went 200cc and is optimistic.  Tolerating diet.   Wearing abd binder.   Requesting PTA ativan  No other complaints.     Gen:  Awake, Alert, NAD  Blood pressure 120/71, temperature 98.3  F (36.8  C), temperature source Oral, resp. rate 16, height 1.549 m (5' 1\"), weight 67.3 kg (148 lb 6 oz) SpO2 97%  Resp - clear to ascultation.    Cardiac - Regular rate & rhythm without murmur  Abdomen - soft, appropriately tender, non distended. Incisions healing appropriately without erythema or purulent drainage.  Extremities - no lower extremity edema.    A/P 33 yo woman s/p da Alvaro assisted repair of ventral hernial and rectus diastasis on 7/10/18.  H/o 4 pregnancies, and bladder sling earlier this year.    - Increase mobility as tolerated  - restart PTA ativan  - Monitor UOP, bladder scan.  - May be discharged today if able to consistently void and empty bladder.   - Instructed.     Derrick Joya PA-C  Office: [phone]  Pager: [phone]    "

## 2018-07-11 NOTE — PLAN OF CARE
Problem: Patient Care Overview  Goal: Plan of Care/Patient Progress Review  Outcome: Adequate for Discharge Date Met: 07/11/18  A x O x4. Up independently. Hypoactive BS, not passing gas. Up to bathroom, voiding adequately.  PVR 60cc and 27cc. Tolerating FL diet. Abdominal lap sites x 5 CDI. Mild to moderate back and abdominal pain, decreased with scheduled Toradol and PRN Oxycodone.

## 2018-07-11 NOTE — PLAN OF CARE
Problem: Patient Care Overview  Goal: Plan of Care/Patient Progress Review  A&O. VSS. Up to BSC Ind. Abd binder in place. BS hypo, -flatus. Oxy and toradol for pain. Straight cath x2 for PVRS greater than 300. DTV.

## 2018-07-12 NOTE — DISCHARGE SUMMARY
"Surgery Discharge Summary    Chelle Rocha MRN# [id number]   YOB: 1985 Age: 32 year old     Date of Admission:  7/10/2018  Date of Discharge:  7/11/2018  3:35 PM  Admitting Physician:  Wyatt Golden MD  Discharging Service:  [facility] General Surgery   Primary Provider: Jessica Alvarez    Discharge Diagnosis:   Principle Diagnosis:  ventral hernia   Ventral hernia  Ventral hernia    Secondary Diagnosis:  Urinary retention     Hospital Course: Chelle Rocha underwent a da Alvaro robotic assisted ventral hernia repair with mesh.  Postoperatively she was admitted to observation unit. She was initiated on a diet and oral pain medications which she tolerated well.  She she was able to fully empty her bladder following surgery and required straight cath ×2.  She was monitored following surgery and post right void residuals were minimal in the afternoon.  Her hospital course was otherwise  uncomplicated and she recovered as anticipated.  S he was tolerating an oral diet, had good pain control on oral medications, was ambulating independently and remained afebrile thus medically appropriate for discharge. She will follow-up with us in two weeks and was advised to call with any questions or concerns.     Inpatient Consultations: No consultations were requested during this admission     Labs/Imaging:   No results found for this or any previous visit (from the past 24 hour(s)).    Disposition:   Discharged to home     Discharge Condition  Discharge condition: Stable   Discharge vitals: Blood pressure 120/71, temperature 98.3  F (36.8  C), temperature source Oral, resp. rate 16, height 1.549 m (5' 1\"), weight 67.3 kg (148 lb 6 oz), last menstrual period 04/10/2018, SpO2 97 %, not currently breastfeeding.     Discharge Medications:   Discharge Medication List as of 7/11/2018  1:17 PM      START taking these medications    Details   oxyCODONE IR (ROXICODONE) 5 MG tablet Take 1-2 tablets (5-10 mg) by mouth " every 4 hours as needed for moderate to severe pain, Disp-25 tablet, R-0, Local Print         CONTINUE these medications which have NOT CHANGED    Details   Cyanocobalamin (VITAMIN B-12 PO) Take 5,000 mcg by mouth every morning, Historical      fluticasone (FLONASE) 50 MCG/ACT spray Spray 2 sprays into both nostrils every morning, Historical      LamoTRIgine (LAMICTAL PO) Take 200 mg by mouth every morning , Historical      LORATADINE PO Take 10 mg by mouth every morning, Historical      LORazepam (ATIVAN PO) Take 0.5-1 mg by mouth daily as needed for anxiety, Historical      Multiple Vitamins-Minerals (CENTRUM) CHEW Take 2 chew tab by mouth every morning, Historical      QUEtiapine Fumarate (SEROQUEL PO) Take 25-50 mg by mouth nightly as needed , Historical      VITAMIN D, CHOLECALCIFEROL, PO Take 2,000 Units by mouth daily, Historical             Discharge Instructions:   Follow-up Appointments     Follow-up and recommended labs and tests       Our office will contact you within 2 weeks to check on your progress and   answer any questions you may have.  If you are doing well, you will not   need to return for a follow up appointment.  If any concerns are   identified over the phone, we may ask you to make an appointment to see a   provider in our clinic.   If you have not received a phone call, have any questions or concerns, or   would like to be seen, please call us at [phone] and ask to speak   with our nurse.  We are located at [address] [place] [address].                  After Care Instructions     Activity       Your activity upon discharge: activity as tolerated, ambulate in house, no driving while on analgesics and no heavy lifting for 2 weeks            Diet       Follow this diet upon discharge: Advance to a regular diet as tolerated            Wound care and dressings       Instructions to care for your wound at home: may get incision wet in shower but do not soak or  scrub.                          Derrick Joya PA-C   Office: [phone]

## 2018-07-13 ENCOUNTER — TELEPHONE (OUTPATIENT)
Dept: SURGERY | Facility: CLINIC | Age: 33
End: 2018-07-13

## 2018-07-13 DIAGNOSIS — G89.18 POST-OP PAIN: ICD-10-CM

## 2018-07-13 RX ORDER — OXYCODONE HYDROCHLORIDE 5 MG/1
5-10 TABLET ORAL EVERY 6 HOURS PRN
Qty: 20 TABLET | Refills: 0 | Status: SHIPPED | OUTPATIENT
Start: 2018-07-13 | End: 2018-07-23

## 2018-07-13 NOTE — TELEPHONE ENCOUNTER
Name of caller: Patient    Reason for Call:  questions    Surgeon:  Dr. Golden     Recent Surgery:  Yes.    If yes, when & what type:  Ventral hernia repair..07/10/18      Best phone number to reach pt at is: [phone]  Ok to leave a message with medical info? Yes.    Pharmacy preferred (if calling for a refill): na

## 2018-07-13 NOTE — TELEPHONE ENCOUNTER
Patient had robotic assisted ventral hernia repair with mesh on 7/10/18 with Dr. Golden.    She is calling today requesting pain medication refill.  She has been needing to use 2 tablets every 4-5 hours.  She is also taking aleve and using heat packs for her back.  She is taking miralax every morning and 2 senna at bedtime. She has not had a bowel movement and is not passing flatus yet, so she has been trying to maintain a full liquid diet until she is passing gas. Encouraged her to begin taking 2 senna in the morning as well as in the evening with plenty of water.    She denies any s/s of infection, reporting that the incisions look ok and she has not had a fever.    Will discuss refill of Oxycodone when PA is available in clinic today and call patient back.  She is aware that the rx will need to be picked up from our .    Kavitha Shine RN

## 2018-07-18 ENCOUNTER — TELEPHONE (OUTPATIENT)
Dept: SURGERY | Facility: CLINIC | Age: 33
End: 2018-07-18

## 2018-07-18 NOTE — TELEPHONE ENCOUNTER
Name of caller: Patient    Reason for Call:  Abdominal binder questions    Surgeon:  Dr. Golden     Recent Surgery:  Yes.    If yes, when & what type:  7/10/18 hernia      Best phone number to reach pt at is: [phone]  Ok to leave a message with medical info? Yes.    Pharmacy preferred (if calling for a refill):

## 2018-07-18 NOTE — TELEPHONE ENCOUNTER
"Patient had robotic assisted ventral hernia repair and rectus plication on 7/10/18 with Dr. Golden.    She is calling today to discuss the abdominal binder. She reports that she was told to stop wearing it after a week and she is wondering if it is ok to \"wean\" off of it, or if she needs to stop wearing it all together.    Informed her that it is ok to wean off of it, and that she should refrain from using it when at rest, but as she increases activity levels, it is ok to wear prn.    She verbalized understanding and agreed.    She will call with any further questions or concerns.    Kavitha Shine RN  "

## 2018-07-23 ENCOUNTER — OFFICE VISIT (OUTPATIENT)
Dept: SURGERY | Facility: CLINIC | Age: 33
End: 2018-07-23
Payer: COMMERCIAL

## 2018-07-23 DIAGNOSIS — Z09 SURGERY FOLLOW-UP EXAMINATION: Primary | ICD-10-CM

## 2018-07-23 PROCEDURE — 99024 POSTOP FOLLOW-UP VISIT: CPT | Performed by: SURGERY

## 2018-07-23 NOTE — MR AVS SNAPSHOT
After Visit Summary   7/23/2018    Chelle Rocha    MRN: [id number]           Patient Information     Date Of Birth          1985        Visit Information        Provider Department      7/23/2018 12:30 PM Wyatt Golden MD [facility]      Today's Diagnoses     Surgery follow-up examination    -  1       Follow-ups after your visit        Who to contact     If you have questions or need follow up information about today's clinic visit or your schedule please contact [facility] directly at [phone].  Normal or non-critical lab and imaging results will be communicated to you by Qual[employer], letter or phone within 4 business days after the clinic has received the results. If you do not hear from us within 7 days, please contact the clinic through [employer] or phone. If you have a critical or abnormal lab result, we will notify you by phone as soon as possible.  Submit refill requests through [email or web address] or call your pharmacy and they will forward the refill request to us. Please allow 3 business days for your refill to be completed.          Additional Information About Your Visit        MyChart Information     [email or web address] gives you secure access to your electronic health record. If you see a primary care provider, you can also send messages to your care team and make appointments. If you have questions, please call your primary care clinic.  If you do not have a primary care provider, please call [phone] and they will assist you.        Care EveryWhere ID     This is your Care EveryWhere ID. This could be used by other organizations to access your [place] medical records  [id number]         Blood Pressure from Last 3 Encounters:   07/11/18 120/71   04/26/18 116/73   04/21/18 119/66    Weight from Last 3 Encounters:   07/10/18 148 lb 6 oz (67.3 kg)   04/26/18 155 lb (70.3 kg)   04/20/18 155 lb (70.3 kg)               Today, you had the following     No orders found for display         Today's Medication Changes          These changes are accurate as of 7/23/18 12:51 PM.  If you have any questions, ask your nurse or doctor.               Stop taking these medicines if you haven't already. Please contact your care team if you have questions.     oxyCODONE IR 5 MG tablet   Commonly known as:  ROXICODONE   Stopped by:  Wyatt Golden MD                    Primary Care Provider Office Phone # Fax #    Jessica VIOLET Alvarez [phone]       [facility] [address]  [facility] [address]        Equal Access to Services     [facility]: Hadii aad ku hadasho Soomaali, waaxda luqadaha, qaybta kaalmada adedarayada, anh singleton . So [facility] [phone].    ATENCIÓN: Si habla español, tiene a avitia disposición servicios gratuitos de asistencia lingüística. [facility] [phone].    We comply with applicable federal civil rights laws and [place] laws. We do not discriminate on the basis of race, color, national origin, age, disability, sex, sexual orientation, or gender identity.            Thank you!     Thank you for choosing [facility]  for your care. Our goal is always to provide you with excellent care. Hearing back from our patients is one way we can continue to improve our services. Please take a few minutes to complete the written survey that you may receive in the mail after your visit with us. Thank you!             Your Updated Medication List - Protect others around you: Learn how to safely use, store and throw away your medicines at www.disposemymeds.org.          This list is accurate as of 7/23/18 12:51 PM.  Always use your most recent med list.                   Brand Name Dispense Instructions for use Diagnosis    ATIVAN PO      Take 0.5-1 mg by mouth daily as needed for anxiety        CENTRUM Chew      Take 2 chew tab by mouth every morning         fluticasone 50 MCG/ACT spray    FLONASE     Spray 2 sprays into both nostrils every morning        LAMICTAL PO      Take 200 mg by mouth every morning        LORATADINE PO      Take 10 mg by mouth every morning        SEROQUEL PO      Take 25-50 mg by mouth nightly as needed        VITAMIN B-12 PO      Take 5,000 mcg by mouth every morning        VITAMIN D (CHOLECALCIFEROL) PO      Take 2,000 Units by mouth daily

## 2018-07-23 NOTE — LETTER
2018    RE: Chelle Rocha, : 1985      Patient returns in follow-up after recent robotic assisted ventral/incisional hernia repair.  She is to be progressing appropriately.  Still having some mild discomfort but overall seems to be improving.  Denies fevers or chills.  Reports normal bowel function.     On examination: Incisions are clean and intact.  There is no evidence of infection.  There is at present no evidence of hernia recurrence.  Overall seems to be an appropriate exam.     Overall doing well.  We discussed ongoing activity restrictions.  Questions were answered.  I would like to see her back again in approximately 1 month for recheck.      Wyatt Golden MD

## 2018-07-28 NOTE — PROGRESS NOTES
Patient returns in follow-up after recent robotic assisted ventral/incisional hernia repair.  She is to be progressing appropriately.  Still having some mild discomfort but overall seems to be improving.  Denies fevers or chills.  Reports normal bowel function.    On examination: Incisions are clean and intact.  There is no evidence of infection.  There is at present no evidence of hernia recurrence.  Overall seems to be an appropriate exam.    Overall doing well.  We discussed ongoing activity restrictions.  Questions were answered.  I would like to see her back again in approximately 1 month for recheck.    Please route or send letter to:  Primary Care Provider (PCP) and Referring Provider

## 2018-08-09 ENCOUNTER — TELEPHONE (OUTPATIENT)
Dept: SURGERY | Facility: CLINIC | Age: 33
End: 2018-08-09

## 2018-08-09 NOTE — TELEPHONE ENCOUNTER
Patient had Robotic Assisted Ventral Hernia Repair with mesh on 07/10/2018 with Dr. Golden.    She is calling triage reporting that ever since surgery, she has been dealing with ongoing back pain.  Initially, ibuprofen was helping with the back pain, but it no longer helping.  She reports that heating pads help temporarily.  She reports that as excruciating pain and is unsure what to do.      She denies having back problems before the surgery.    The pain is located in the middle of her back, traveling down both sides of her spine. She feels that it could be muscular in nature.      Will discuss with PA in clinic today possibly having patient try a muscle relaxer to see if this provides relief.  Otherwise, possible a referral to an orthopedic facility.    Will call patient back after discussing with PA.    Kavitha Shine RN

## 2018-10-08 ENCOUNTER — OFFICE VISIT (OUTPATIENT)
Dept: SURGERY | Facility: CLINIC | Age: 33
End: 2018-10-08
Payer: COMMERCIAL

## 2018-10-08 DIAGNOSIS — Z09 SURGERY FOLLOW-UP EXAMINATION: Primary | ICD-10-CM

## 2018-10-08 PROCEDURE — 99024 POSTOP FOLLOW-UP VISIT: CPT | Performed by: SURGERY

## 2018-10-08 NOTE — MR AVS SNAPSHOT
After Visit Summary   10/8/2018    Chelle Rocha    MRN: [id number]           Patient Information     Date Of Birth          1985        Visit Information        Provider Department      10/8/2018 1:30 PM Wyatt Golden MD [facility]      Today's Diagnoses     Surgery follow-up examination    -  1       Follow-ups after your visit        Who to contact     If you have questions or need follow up information about today's clinic visit or your schedule please contact [facility] directly at [phone].  Normal or non-critical lab and imaging results will be communicated to you by Pickethart, letter or phone within 4 business days after the clinic has received the results. If you do not hear from us within 7 days, please contact the clinic through Frolikt or phone. If you have a critical or abnormal lab result, we will notify you by phone as soon as possible.  Submit refill requests through Daily Dealy or call your pharmacy and they will forward the refill request to us. Please allow 3 business days for your refill to be completed.          Additional Information About Your Visit        MyChart Information     Daily Dealy gives you secure access to your electronic health record. If you see a primary care provider, you can also send messages to your care team and make appointments. If you have questions, please call your primary care clinic.  If you do not have a primary care provider, please call [phone] and they will assist you.        Care EveryWhere ID     This is your Care EveryWhere ID. This could be used by other organizations to access your [place] medical records  [id number]         Blood Pressure from Last 3 Encounters:   07/11/18 120/71   04/26/18 116/73   04/21/18 119/66    Weight from Last 3 Encounters:   07/10/18 148 lb 6 oz (67.3 kg)   04/26/18 155 lb (70.3 kg)   04/20/18 155 lb (70.3 kg)               Today, you had the following     No orders found for display       Primary Care Provider Office Phone # Fax #    Jessica Alvarez [phone]       [facility] [address] [facility] [address]  [facility] [address]        Equal Access to Services     [facility] DANIE : Hadii aad ku hadasho Soomaali, waaxda luqadaha, qaybta kaalmada adeegyada, waxmoises purcell moden willian swiftdinaismael singleton . So [facility] [phone].    ATENCIÓN: Si habla español, tiene a avitia disposición servicios gratuitos de asistencia lingüística. Llame al [phone].    We comply with applicable federal civil rights laws and [place] laws. We do not discriminate on the basis of race, color, national origin, age, disability, sex, sexual orientation, or gender identity.            Thank you!     Thank you for choosing [facility]  for your care. Our goal is always to provide you with excellent care. Hearing back from our patients is one way we can continue to improve our services. Please take a few minutes to complete the written survey that you may receive in the mail after your visit with us. Thank you!             Your Updated Medication List - Protect others around you: Learn how to safely use, store and throw away your medicines at www.disposemymeds.org.          This list is accurate as of 10/8/18  2:05 PM.  Always use your most recent med list.                   Brand Name Dispense Instructions for use Diagnosis    ATIVAN PO      Take 0.5-1 mg by mouth daily as needed for anxiety        CENTRUM Chew      Take 2 chew tab by mouth every morning        fluticasone 50 MCG/ACT spray    FLONASE     Spray 2 sprays into both nostrils every morning        LAMICTAL PO      Take 200 mg by mouth every morning        LORATADINE PO      Take 10 mg by mouth every morning        SEROQUEL PO      Take 25-50 mg by mouth nightly as needed        VITAMIN B-12 PO      Take 5,000 mcg by mouth every morning        VITAMIN D (CHOLECALCIFEROL)  PO      Take 2,000 Units by mouth daily

## 2018-10-08 NOTE — LETTER
2018    Re: Chelle Goredutch - 1985    Patient presents in follow-up after recent da Alvaro assisted ventral hernia repair and rectus plication. She overall states that she is feeling well.  She has no significant ongoing discomfort or pain.  Has returned to mostly normal activity.  Her bowel function is normal.     On examination: Her incisions are well-healed.  There is good approximation of the rectus abdominis and no evidence of recurrent hernia.     Overall she is doing well.  We discussed advancement of activity.  Her questions were answered.  She may follow-up on an as-needed basis.      Wyatt Golden M.D.  [facility]  [phone]

## 2018-10-11 NOTE — PROGRESS NOTES
Patient presents in follow-up after recent da Alvaro assisted ventral hernia repair and rectus plication.  She overall states that she is feeling well.  She has no significant ongoing discomfort or pain.  Has returned to mostly normal activity.  Her bowel function is normal.    On examination: Her incisions are well-healed.  There is good approximation of the rectus abdominis and no evidence of recurrent hernia.    Overall she is doing well.  We discussed advancement of activity.  Her questions were answered.  She may follow-up on an as-needed basis.    Please route or send letter to:  Primary Care Provider (PCP)

## 2019-09-30 ENCOUNTER — HEALTH MAINTENANCE LETTER (OUTPATIENT)
Age: 34
End: 2019-09-30

## 2021-01-15 ENCOUNTER — HEALTH MAINTENANCE LETTER (OUTPATIENT)
Age: 36
End: 2021-01-15

## 2021-10-24 ENCOUNTER — HEALTH MAINTENANCE LETTER (OUTPATIENT)
Age: 36
End: 2021-10-24

## 2022-02-13 ENCOUNTER — HEALTH MAINTENANCE LETTER (OUTPATIENT)
Age: 37
End: 2022-02-13

## 2022-08-23 NOTE — PROGRESS NOTES
Admission medication history interview status for the 4/20/2018  admission is complete. See EPIC admission navigator for prior to admission medications     Medication history source reliability:Good    Medication history interview source(s):Patient    Medication history resources (including written lists, pill bottles, clinic record):Patient emailed in her medication list prior to surgery    [facility] pharmacy[facility]    Additional medication history information not noted on PTA med list :None    Time spent in this activity: 40 minutes    Prior to Admission medications    Medication Sig Last Dose Taking? Auth Provider   Biotin w/ Vitamins C & E (HAIR SKIN & NAILS GUMMIES PO) Take 2 chew tab by mouth daily 4/19/2018 at AM Yes Reported, Patient   ferrous sulfate (IRON) 325 (65 Fe) MG tablet Take 325 mg by mouth every morning 4/19/2018 at AM Yes Reported, Patient   HYDROXYZINE HCL PO Take 25 mg by mouth daily as needed (Anxiety) Not yet needed Yes Reported, Patient   LamoTRIgine (LAMICTAL PO) Take 100 mg by mouth every morning 4/20/2018 at 0715 Yes Reported, Patient   PROPRANOLOL HCL PO Take 20 mg by mouth 2 times daily as needed (Anxiety) 4/13/2018 at PRN Yes Reported, Patient   QUEtiapine Fumarate (SEROQUEL PO) Take 25 mg by mouth nightly as needed 4/17/2018 at HS Yes Reported, Patient   senna-docusate (SENOKOT-S;PERICOLACE) 8.6-50 MG per tablet Take 1 tablet by mouth 2 times daily 4/19/2018 at AM Yes Reported, Patient          Keystone Flap Text: The defect edges were debeveled with a #15 scalpel blade.  Given the location of the defect, shape of the defect a keystone flap was deemed most appropriate.  Using a sterile surgical marker, an appropriate keystone flap was drawn incorporating the defect, outlining the appropriate donor tissue and placing the expected incisions within the relaxed skin tension lines where possible. The area thus outlined was incised deep to adipose tissue with a #15 scalpel blade.  The skin margins were undermined to an appropriate distance in all directions around the primary defect and laterally outward around the flap utilizing iris scissors.

## 2022-10-15 ENCOUNTER — HEALTH MAINTENANCE LETTER (OUTPATIENT)
Age: 37
End: 2022-10-15

## 2023-02-13 ENCOUNTER — OFFICE VISIT (OUTPATIENT)
Dept: SURGERY | Facility: CLINIC | Age: 38
End: 2023-02-13
Payer: COMMERCIAL

## 2023-02-13 VITALS
RESPIRATION RATE: 16 BRPM | DIASTOLIC BLOOD PRESSURE: 60 MMHG | SYSTOLIC BLOOD PRESSURE: 112 MMHG | BODY MASS INDEX: 29.27 KG/M2 | WEIGHT: 155 LBS | HEIGHT: 61 IN | OXYGEN SATURATION: 95 % | HEART RATE: 80 BPM

## 2023-02-13 DIAGNOSIS — R10.84 ABDOMINAL PAIN, GENERALIZED: Primary | ICD-10-CM

## 2023-02-13 PROCEDURE — 99244 OFF/OP CNSLTJ NEW/EST MOD 40: CPT | Performed by: SURGERY

## 2023-02-13 RX ORDER — ACETAMINOPHEN 160 MG
TABLET,DISINTEGRATING ORAL
COMMUNITY
Start: 2018-06-19

## 2023-02-15 ENCOUNTER — ANCILLARY PROCEDURE (OUTPATIENT)
Dept: CT IMAGING | Facility: CLINIC | Age: 38
End: 2023-02-15
Attending: SURGERY
Payer: COMMERCIAL

## 2023-02-15 DIAGNOSIS — R10.84 ABDOMINAL PAIN, GENERALIZED: ICD-10-CM

## 2023-02-15 PROCEDURE — 74176 CT ABD & PELVIS W/O CONTRAST: CPT

## 2023-02-21 NOTE — PROGRESS NOTES
[facility]  Surgery Consultation    Primary care provider:  Jessica Alvarez [phone]  Consultation requested by: Mami Mayen CNP    HPI: This patient is a 37-year-old female known to me from prior ventral hernia repair in 2018.  She presents now as a referral from the above provider for consultation regarding possible recurrence of hernia.  She has recently undergone a moderate amount of cosmetic surgery including the whole body liposuction etc. and is noted some asymmetry and bulging in the abdomen.  There has been no GI or bowel obstruction symptoms.    PMH:   has a past medical history of Abnormal Pap smear, Anemia, Bipolar 1 disorder (H), Breast disorder, History of blood transfusion, Migraine, PONV (postoperative nausea and vomiting), Postpartum depression, Renal disease, Rh incompatibility, Seasonal allergies, TMJ (temporomandibular joint syndrome), UTI in pregnancy, and Wounds and injuries.  PSH:    has a past surgical history that includes WISDOM TEETH EXTRACTION[; Arthroscopy temporomandibular joint (TMJ) (7/12/2012); Davinci Single Site Pelviscopy (N/A, 9/25/2014); Breast surgery (2011, 2013); ENT surgery (12/2003); Laparotomy, tubal ligation, combined (N/A, 12/9/2017); Laparoscopic cholecystectomy with cholangiograms (N/A, 1/24/2018); DAVINCI SACROCOLPOPEXY, MIDURETHRAL SLING, CYSTOSCOPY (N/A, 4/20/2018); Davinci Lysis of Adhesions (N/A, 4/20/2018); GYN surgery; leep tx, cervical (2005); Colposcopy, biopsy, combined (2005); REMOVAL OF OVARIAN CYST(S) (2006); Operative hysteroscopy with morcellator (N/A, 9/25/2014); daVINCI hysterectomy supracervical (N/A, 4/20/2018); and Davinci Xi Herniorrhaphy Ventral (N/A, 7/10/2018).  Social History:   reports that she has never smoked. She has never used smokeless tobacco. She reports that she does not drink alcohol and does not use drugs.  Family History:  family history is not on file.  Medications/Allergies: Home medications and  "allergies reviewed.    ROS:  The 10 point Review of Systems is negative other than noted in the HPI.    Physical Exam:  /60   Pulse 80   Resp 16   Ht 1.549 m (5' 1\")   Wt 70.3 kg (155 lb)   SpO2 95%   BMI 29.29 kg/m    GENERAL: Generally appears well.  Psych: Alert and Oriented.  Normal affect  Eyes: Sclera clear  Respiratory:  Lungs clear to ausculation bilaterally with good air excursion  Cardiovascular:  Regular Rate and Rhythm with no murmurs gallops or rubs, normal peripheral pulses  GI: Abdomen Non Distended Soft Mild tenderness to palpation diffusely there is also some visible asymmetry in the abdominal wall with the right side being slightly more protuberant.  No hernias palpated..  Lymphatic/Hematologic/Immune:  No femoral or cervical lymphadenopathy.  Integumentary:  No rashes  Neurological: grossly intact     All new lab and imaging data was reviewed.     Impression and Plan:  Patient is a 37 year old female with diffuse and generalized abdominal pain of unclear etiology.  Prior history of ventral hernia repair and recent history of abdominal wall surgeries.    PLAN: There is no obvious evidence of recurrence of hernia.  We will send her for a CT scan of the abdomen and pelvis to further assess abdominal wall anatomy.  She felt comfortable with this plan.  Pending the outcome of the study additional recommendations may follow.      Thank you very much for this consult.    Wyatt Golden M.D.  [facility]  [phone]    Please route or send letter to:  Primary Care Provider (PCP) and Referring Provider  "

## 2023-02-22 NOTE — RESULT ENCOUNTER NOTE
Result reviewed, result normal or expected, please call patient  Mesh is in good position, no recurrent hernia or significant diastasis

## 2023-03-26 ENCOUNTER — HEALTH MAINTENANCE LETTER (OUTPATIENT)
Age: 38
End: 2023-03-26

## 2024-05-26 ENCOUNTER — HEALTH MAINTENANCE LETTER (OUTPATIENT)
Age: 39
End: 2024-05-26

## 2025-06-14 ENCOUNTER — HEALTH MAINTENANCE LETTER (OUTPATIENT)
Age: 40
End: 2025-06-14

## 2025-08-18 ENCOUNTER — LAB REQUISITION (OUTPATIENT)
Dept: LAB | Facility: CLINIC | Age: 40
End: 2025-08-18
Payer: COMMERCIAL

## (undated) DEVICE — ENDO TROCAR FIRST ENTRY KII FIOS Z-THRD 05X100MM CTF03

## (undated) DEVICE — DEVICE TACKER ENDO RELIATACK ARTIC HANDLE RELTACK4XDPT

## (undated) DEVICE — SUCTION TIP YANKAUER STR K87

## (undated) DEVICE — ESU GROUND PAD UNIVERSAL W/O CORD

## (undated) DEVICE — GLOVE PROTEXIS W/NEU-THERA 8.0  2D73TE80

## (undated) DEVICE — KIT PATIENT POSITIONING PIGAZZI LATEX FREE 40580

## (undated) DEVICE — SU PLAIN 0 FN-2 27" N864H

## (undated) DEVICE — SU VICRYL 4-0 PS-2 18" UND J496H

## (undated) DEVICE — DAVINCI XI DRAPE ARM 470015

## (undated) DEVICE — DRSG GAUZE 4X4" 3033

## (undated) DEVICE — EVAC SYSTEM CLEAR FLOW SC082500

## (undated) DEVICE — DAVINCI XI OBTURATOR BLADELESS 8MM 470359

## (undated) DEVICE — DRAPE MINOR PROCEDURE LAP 29496

## (undated) DEVICE — Device

## (undated) DEVICE — GOWN IMPERVIOUS SPECIALTY XLG/XLONG 32474

## (undated) DEVICE — ENDO ACCESS PLATFORM GELPOINT SGL INCISION CNGL2

## (undated) DEVICE — SU VICRYL 3-0 SH 27" J316H

## (undated) DEVICE — DRAPE C-ARM 60X42" 1013

## (undated) DEVICE — ENDO TROCAR OPTICAL 12MM VERSAPORT PLUS W/FIX CAN ONB12STF

## (undated) DEVICE — SU VICRYL 2-0 SH 27" J317H

## (undated) DEVICE — SUCTION CANISTER MEDIVAC LINER 3000ML W/LID 65651-530

## (undated) DEVICE — PACK LAP CHOLE SLC15LCFSD

## (undated) DEVICE — SU MONOCRYL 4-0 PS-2 18" UND Y496G

## (undated) DEVICE — NDL ANGIOCATH 14GA 5.25" 382269

## (undated) DEVICE — CONNECTOR STOPCOCK 3 WAY MALE LL HI-FLO MX9311L

## (undated) DEVICE — TUBING IRRIG CYSTO/BLADDER SET 81" LF 2C4040

## (undated) DEVICE — SOL NACL 0.9% INJ 1000ML BAG 2B1324X

## (undated) DEVICE — LUBRICANT INST ELECTROLUBE EL101

## (undated) DEVICE — SYSTEM CLEARIFY VISUALIZATION 21-345

## (undated) DEVICE — DAVINCI XI NDL DRIVER MEGA SUTURE CUT 8MM 470309

## (undated) DEVICE — SU VICRYL 2-0 CT-2 27" J333H

## (undated) DEVICE — LINEN TOWEL PACK X5 5464

## (undated) DEVICE — SU VICRYL 0 UR-6 27" J603H

## (undated) DEVICE — DEVICE SUTURE GRASPER TROCAR CLOSURE 14GA PMITCSG

## (undated) DEVICE — DAVINCI XI DRAPE COLUMN 470341

## (undated) DEVICE — GLOVE PROTEXIS MICRO 6.5  2D73PM65

## (undated) DEVICE — WIPES FOLEY CARE SURESTEP PROVON DFC100

## (undated) DEVICE — DRAPE SHEET REV FOLD 3/4 9349

## (undated) DEVICE — ESU HOLDER LAP INST DISP PURPLE LONG 330MM H-PRO-330

## (undated) DEVICE — GLOVE PROTEXIS BLUE W/NEU-THERA 6.5  2D73EB65

## (undated) DEVICE — DAVINCI HOT SHEARS TIP COVER  400180

## (undated) DEVICE — ENDO CANNULA 05MM VERSAONE UNIVERSAL UNVCA5STF

## (undated) DEVICE — SOL WATER IRRIG 1000ML BOTTLE 2F7114

## (undated) DEVICE — CATH CHOLANGIOGRAM 4.5FR TAUT METAL TIP 20018-M55

## (undated) DEVICE — DAVINCI XI SEAL UNIVERSAL 5-8MM 470361

## (undated) DEVICE — SYR 20ML LL W/O NDL 302830

## (undated) DEVICE — PREP DURAPREP 26ML APL 8630

## (undated) DEVICE — SUCTION IRR STRYKERFLOW II W/TIP 250-070-520

## (undated) DEVICE — GLOVE PROTEXIS W/NEU-THERA 6.5  2D73TE65

## (undated) DEVICE — PACK DAVINCI GYN SMA15GDFS1

## (undated) DEVICE — SOL NACL 0.9% IRRIG 1000ML BOTTLE 07138-09

## (undated) DEVICE — GLOVE PROTEXIS MICRO 6.0  2D73PM60

## (undated) DEVICE — RAD RX ISOVUE 300 (50ML) 61% IOPAMIDOL CHARGE PER ML

## (undated) DEVICE — SOL WATER IRRIG 3000ML BAG 2B7117

## (undated) DEVICE — GLOVE PROTEXIS POWDER FREE 6.5 ORTHOPEDIC 2D73ET65

## (undated) DEVICE — UTERINE MANIPULATOR RUMI TIP SACROCOLPOPEXY DST END SACRO-1C

## (undated) DEVICE — ENDO POUCH REIACATCH 2.44" 10MM CATCH10

## (undated) DEVICE — DAVINCI XI FCP BIPOLAR FENESTRATED 470205

## (undated) DEVICE — ENDO TROCAR OPTICAL 05MM VERSAPORT PLUS W/FIX CAN ONB5STF

## (undated) DEVICE — LIGHT HANDLE X2

## (undated) DEVICE — TUBING SUCTION MEDI-VAC SOFT 3/16"X20' N520A

## (undated) DEVICE — SU DERMABOND ADVANCED .7ML DNX12

## (undated) DEVICE — ENDO TROCAR OPTICAL 11MM VERSAPORT PLUS W/FIX CAN ONB11STF

## (undated) DEVICE — SU STRATAFIXÂ  PDO 0 36MM MH TAPERPOINT 9" SXPD2B410

## (undated) DEVICE — DRSG STERI STRIP 1/2X4" R1547

## (undated) DEVICE — PREP CHLORAPREP 26ML TINTED ORANGE  260815

## (undated) DEVICE — SU WND CLOSURE VLOC 180 ABS 3-0 12" V-20 VLOCL0614

## (undated) DEVICE — SU WND CLOSURE VLOC 180 ABS 0 6" GS-21 VLOCL0306

## (undated) DEVICE — SU STRATAFIX PDS PLUS 1 CT-1 18" SXPP1A404

## (undated) DEVICE — SU DERMABOND MINI DHVM12

## (undated) DEVICE — SU ETHIBOND 0 CT-2 30" X412H

## (undated) DEVICE — PACK MINOR SBA15MIFSE

## (undated) DEVICE — CATH TRAY FOLEY SURESTEP 16FR WDRAIN BAG STLK LATEX A300316A

## (undated) DEVICE — DAVINCI XI MONOPOLAR SCISSORS HOT SHEARS 8MM 470179

## (undated) DEVICE — NDL INSUFFLATION 120MM VERRES 172015

## (undated) DEVICE — SU VICRYL 0 CT-1 27" J340H

## (undated) DEVICE — CLIP APPLIER ENDO 05MM MED/LG 176630

## (undated) DEVICE — SU VICRYL 4-0 RB-1 27" J304

## (undated) RX ORDER — FENTANYL CITRATE 50 UG/ML
INJECTION, SOLUTION INTRAMUSCULAR; INTRAVENOUS
Status: DISPENSED
Start: 2018-07-10

## (undated) RX ORDER — HYDROMORPHONE HYDROCHLORIDE 1 MG/ML
INJECTION, SOLUTION INTRAMUSCULAR; INTRAVENOUS; SUBCUTANEOUS
Status: DISPENSED
Start: 2018-07-10

## (undated) RX ORDER — VECURONIUM BROMIDE 1 MG/ML
INJECTION, POWDER, LYOPHILIZED, FOR SOLUTION INTRAVENOUS
Status: DISPENSED
Start: 2018-07-10

## (undated) RX ORDER — DEXAMETHASONE SODIUM PHOSPHATE 4 MG/ML
INJECTION, SOLUTION INTRA-ARTICULAR; INTRALESIONAL; INTRAMUSCULAR; INTRAVENOUS; SOFT TISSUE
Status: DISPENSED
Start: 2017-12-09

## (undated) RX ORDER — HYDROMORPHONE HYDROCHLORIDE 1 MG/ML
INJECTION, SOLUTION INTRAMUSCULAR; INTRAVENOUS; SUBCUTANEOUS
Status: DISPENSED
Start: 2018-04-20

## (undated) RX ORDER — ONDANSETRON 2 MG/ML
INJECTION INTRAMUSCULAR; INTRAVENOUS
Status: DISPENSED
Start: 2018-01-24

## (undated) RX ORDER — DIPHENHYDRAMINE HYDROCHLORIDE 50 MG/ML
INJECTION INTRAMUSCULAR; INTRAVENOUS
Status: DISPENSED
Start: 2018-01-24

## (undated) RX ORDER — NEOSTIGMINE METHYLSULFATE 1 MG/ML
VIAL (ML) INJECTION
Status: DISPENSED
Start: 2018-07-10

## (undated) RX ORDER — PROPOFOL 10 MG/ML
INJECTION, EMULSION INTRAVENOUS
Status: DISPENSED
Start: 2017-12-09

## (undated) RX ORDER — BUPIVACAINE HYDROCHLORIDE 2.5 MG/ML
INJECTION, SOLUTION EPIDURAL; INFILTRATION; INTRACAUDAL
Status: DISPENSED
Start: 2018-04-20

## (undated) RX ORDER — GLYCOPYRROLATE 0.2 MG/ML
INJECTION, SOLUTION INTRAMUSCULAR; INTRAVENOUS
Status: DISPENSED
Start: 2018-01-24

## (undated) RX ORDER — CEFAZOLIN SODIUM 2 G/100ML
INJECTION, SOLUTION INTRAVENOUS
Status: DISPENSED
Start: 2018-07-10

## (undated) RX ORDER — FENTANYL CITRATE 50 UG/ML
INJECTION, SOLUTION INTRAMUSCULAR; INTRAVENOUS
Status: DISPENSED
Start: 2018-01-24

## (undated) RX ORDER — KETOROLAC TROMETHAMINE 30 MG/ML
INJECTION, SOLUTION INTRAMUSCULAR; INTRAVENOUS
Status: DISPENSED
Start: 2018-01-24

## (undated) RX ORDER — LIDOCAINE HYDROCHLORIDE 20 MG/ML
INJECTION, SOLUTION EPIDURAL; INFILTRATION; INTRACAUDAL; PERINEURAL
Status: DISPENSED
Start: 2018-04-20

## (undated) RX ORDER — LIDOCAINE HYDROCHLORIDE 10 MG/ML
INJECTION, SOLUTION INFILTRATION; PERINEURAL
Status: DISPENSED
Start: 2018-01-24

## (undated) RX ORDER — DEXAMETHASONE SODIUM PHOSPHATE 4 MG/ML
INJECTION, SOLUTION INTRA-ARTICULAR; INTRALESIONAL; INTRAMUSCULAR; INTRAVENOUS; SOFT TISSUE
Status: DISPENSED
Start: 2018-04-20

## (undated) RX ORDER — CEFAZOLIN SODIUM 2 G/100ML
INJECTION, SOLUTION INTRAVENOUS
Status: DISPENSED
Start: 2017-12-09

## (undated) RX ORDER — EPINEPHRINE 1 MG/ML
INJECTION, SOLUTION INTRAMUSCULAR; SUBCUTANEOUS
Status: DISPENSED
Start: 2018-01-24

## (undated) RX ORDER — DEXAMETHASONE SODIUM PHOSPHATE 4 MG/ML
INJECTION, SOLUTION INTRA-ARTICULAR; INTRALESIONAL; INTRAMUSCULAR; INTRAVENOUS; SOFT TISSUE
Status: DISPENSED
Start: 2018-07-10

## (undated) RX ORDER — BUPIVACAINE HYDROCHLORIDE AND EPINEPHRINE 5; 5 MG/ML; UG/ML
INJECTION, SOLUTION EPIDURAL; INTRACAUDAL; PERINEURAL
Status: DISPENSED
Start: 2018-04-20

## (undated) RX ORDER — ONDANSETRON 2 MG/ML
INJECTION INTRAMUSCULAR; INTRAVENOUS
Status: DISPENSED
Start: 2017-12-09

## (undated) RX ORDER — GLYCOPYRROLATE 0.2 MG/ML
INJECTION, SOLUTION INTRAMUSCULAR; INTRAVENOUS
Status: DISPENSED
Start: 2018-07-10

## (undated) RX ORDER — GLYCOPYRROLATE 0.2 MG/ML
INJECTION, SOLUTION INTRAMUSCULAR; INTRAVENOUS
Status: DISPENSED
Start: 2017-12-09

## (undated) RX ORDER — ONDANSETRON 2 MG/ML
INJECTION INTRAMUSCULAR; INTRAVENOUS
Status: DISPENSED
Start: 2018-04-20

## (undated) RX ORDER — PROPOFOL 10 MG/ML
INJECTION, EMULSION INTRAVENOUS
Status: DISPENSED
Start: 2018-01-24

## (undated) RX ORDER — MEPERIDINE HYDROCHLORIDE 25 MG/ML
INJECTION INTRAMUSCULAR; INTRAVENOUS; SUBCUTANEOUS
Status: DISPENSED
Start: 2018-07-10

## (undated) RX ORDER — FENTANYL CITRATE 50 UG/ML
INJECTION, SOLUTION INTRAMUSCULAR; INTRAVENOUS
Status: DISPENSED
Start: 2017-12-09

## (undated) RX ORDER — LIDOCAINE HYDROCHLORIDE 20 MG/ML
INJECTION, SOLUTION EPIDURAL; INFILTRATION; INTRACAUDAL; PERINEURAL
Status: DISPENSED
Start: 2018-01-24

## (undated) RX ORDER — PROPOFOL 10 MG/ML
INJECTION, EMULSION INTRAVENOUS
Status: DISPENSED
Start: 2018-07-10

## (undated) RX ORDER — CEFAZOLIN SODIUM 2 G/100ML
INJECTION, SOLUTION INTRAVENOUS
Status: DISPENSED
Start: 2018-04-20

## (undated) RX ORDER — BUPIVACAINE HYDROCHLORIDE AND EPINEPHRINE 5; 5 MG/ML; UG/ML
INJECTION, SOLUTION EPIDURAL; INTRACAUDAL; PERINEURAL
Status: DISPENSED
Start: 2018-07-10

## (undated) RX ORDER — CEFAZOLIN SODIUM 1 G/3ML
INJECTION, POWDER, FOR SOLUTION INTRAMUSCULAR; INTRAVENOUS
Status: DISPENSED
Start: 2018-07-10

## (undated) RX ORDER — DEXAMETHASONE SODIUM PHOSPHATE 4 MG/ML
INJECTION, SOLUTION INTRA-ARTICULAR; INTRALESIONAL; INTRAMUSCULAR; INTRAVENOUS; SOFT TISSUE
Status: DISPENSED
Start: 2018-01-24

## (undated) RX ORDER — CEFAZOLIN SODIUM 1 G/3ML
INJECTION, POWDER, FOR SOLUTION INTRAMUSCULAR; INTRAVENOUS
Status: DISPENSED
Start: 2018-04-20

## (undated) RX ORDER — KETOROLAC TROMETHAMINE 30 MG/ML
INJECTION, SOLUTION INTRAMUSCULAR; INTRAVENOUS
Status: DISPENSED
Start: 2018-07-10

## (undated) RX ORDER — LIDOCAINE HYDROCHLORIDE 20 MG/ML
INJECTION, SOLUTION EPIDURAL; INFILTRATION; INTRACAUDAL; PERINEURAL
Status: DISPENSED
Start: 2018-07-10

## (undated) RX ORDER — VECURONIUM BROMIDE 1 MG/ML
INJECTION, POWDER, LYOPHILIZED, FOR SOLUTION INTRAVENOUS
Status: DISPENSED
Start: 2018-04-20

## (undated) RX ORDER — BUPIVACAINE HYDROCHLORIDE 2.5 MG/ML
INJECTION, SOLUTION EPIDURAL; INFILTRATION; INTRACAUDAL
Status: DISPENSED
Start: 2018-01-24

## (undated) RX ORDER — FENTANYL CITRATE 50 UG/ML
INJECTION, SOLUTION INTRAMUSCULAR; INTRAVENOUS
Status: DISPENSED
Start: 2018-04-20

## (undated) RX ORDER — LIDOCAINE HYDROCHLORIDE 20 MG/ML
INJECTION, SOLUTION EPIDURAL; INFILTRATION; INTRACAUDAL; PERINEURAL
Status: DISPENSED
Start: 2017-12-09

## (undated) RX ORDER — HYDROMORPHONE HYDROCHLORIDE 1 MG/ML
INJECTION, SOLUTION INTRAMUSCULAR; INTRAVENOUS; SUBCUTANEOUS
Status: DISPENSED
Start: 2017-12-09

## (undated) RX ORDER — NEOSTIGMINE METHYLSULFATE 1 MG/ML
VIAL (ML) INJECTION
Status: DISPENSED
Start: 2018-01-24

## (undated) RX ORDER — ONDANSETRON 2 MG/ML
INJECTION INTRAMUSCULAR; INTRAVENOUS
Status: DISPENSED
Start: 2018-07-10